# Patient Record
Sex: MALE | Race: WHITE | URBAN - METROPOLITAN AREA
[De-identification: names, ages, dates, MRNs, and addresses within clinical notes are randomized per-mention and may not be internally consistent; named-entity substitution may affect disease eponyms.]

---

## 2017-03-31 DIAGNOSIS — F41.1 GAD (GENERALIZED ANXIETY DISORDER): ICD-10-CM

## 2017-03-31 NOTE — TELEPHONE ENCOUNTER
Sertraline    Last Written Prescription Date: 03/24/16  Last Fill Quantity: 90, # refills: 3  Last Office Visit with [facility] primary care provider:  03/24/16        Last PHQ-9 score on record=   PHQ-9 SCORE 3/24/2016   Total Score -   Total Score 1

## 2017-04-03 RX ORDER — SERTRALINE HYDROCHLORIDE 100 MG/1
100 TABLET, FILM COATED ORAL DAILY
Qty: 30 TABLET | Refills: 0 | Status: SHIPPED | OUTPATIENT
Start: 2017-04-03 | End: 2017-05-11

## 2017-05-10 ENCOUNTER — OFFICE VISIT (OUTPATIENT)
Dept: FAMILY MEDICINE | Facility: CLINIC | Age: 27
End: 2017-05-10
Payer: COMMERCIAL

## 2017-05-10 VITALS
DIASTOLIC BLOOD PRESSURE: 77 MMHG | HEIGHT: 71 IN | TEMPERATURE: 98.5 F | WEIGHT: 215 LBS | SYSTOLIC BLOOD PRESSURE: 120 MMHG | BODY MASS INDEX: 30.1 KG/M2 | HEART RATE: 62 BPM

## 2017-05-10 DIAGNOSIS — Z00.01 ENCOUNTER FOR ROUTINE ADULT MEDICAL EXAM WITH ABNORMAL FINDINGS: Primary | ICD-10-CM

## 2017-05-10 DIAGNOSIS — Z13.6 CARDIOVASCULAR SCREENING; LDL GOAL LESS THAN 160: ICD-10-CM

## 2017-05-10 DIAGNOSIS — F41.1 GAD (GENERALIZED ANXIETY DISORDER): ICD-10-CM

## 2017-05-10 DIAGNOSIS — Z23 ENCOUNTER FOR IMMUNIZATION: ICD-10-CM

## 2017-05-10 PROCEDURE — 99395 PREV VISIT EST AGE 18-39: CPT | Mod: 25 | Performed by: FAMILY MEDICINE

## 2017-05-10 PROCEDURE — 90471 IMMUNIZATION ADMIN: CPT | Performed by: FAMILY MEDICINE

## 2017-05-10 PROCEDURE — 90715 TDAP VACCINE 7 YRS/> IM: CPT | Performed by: FAMILY MEDICINE

## 2017-05-10 ASSESSMENT — ANXIETY QUESTIONNAIRES
3. WORRYING TOO MUCH ABOUT DIFFERENT THINGS: SEVERAL DAYS
2. NOT BEING ABLE TO STOP OR CONTROL WORRYING: SEVERAL DAYS
7. FEELING AFRAID AS IF SOMETHING AWFUL MIGHT HAPPEN: NOT AT ALL
IF YOU CHECKED OFF ANY PROBLEMS ON THIS QUESTIONNAIRE, HOW DIFFICULT HAVE THESE PROBLEMS MADE IT FOR YOU TO DO YOUR WORK, TAKE CARE OF THINGS AT HOME, OR GET ALONG WITH OTHER PEOPLE: SOMEWHAT DIFFICULT
5. BEING SO RESTLESS THAT IT IS HARD TO SIT STILL: NOT AT ALL
1. FEELING NERVOUS, ANXIOUS, OR ON EDGE: SEVERAL DAYS
6. BECOMING EASILY ANNOYED OR IRRITABLE: MORE THAN HALF THE DAYS
GAD7 TOTAL SCORE: 5

## 2017-05-10 ASSESSMENT — PATIENT HEALTH QUESTIONNAIRE - PHQ9: 5. POOR APPETITE OR OVEREATING: NOT AT ALL

## 2017-05-10 NOTE — MR AVS SNAPSHOT
After Visit Summary   5/10/2017    Elkin Mullen    MRN: [id number]           Patient Information     Date Of Birth          1990        Visit Information        Provider Department      5/10/2017 7:00 AM Jaguar Vernon MD [facility]        Today's Diagnoses     Encounter for routine adult medical exam with abnormal findings    -  1    LAURA (generalized anxiety disorder)        BMI 30.0-30.9,adult        Need for diphtheria-tetanus-pertussis (Tdap) vaccine, adult/adolescent        CARDIOVASCULAR SCREENING; LDL GOAL LESS THAN 160          Care Instructions    Tetanus, pertussis, diphteria vaccination today.    Go to [facility] now for your blood draw.  You will be contacted in 1-2 days for results of your lab tests.    Eat 5 cups of vegetables, fruits and whole grains per day.  Limit starchy food (white rice, white bread, white pasta, white potatoes) to less than a cup per meal.  Minimize sweets, junk food and fastfood.  Exercise: moderate intensity sustained for at least 30 mins per episode, goal of 150 mins per week at least  Weight loss goal: 5-10 % of current weight in 4-6 months to start with . Goal is to get your BMI to 25.    Drink at least one glass of milk per day. Incorporate fish and other calcium-rich food to your diet.    Get a dental and vision exam soon!    You preferred to ask your insurance about HIV and hepatitis C screening coverage.  Having tattoos may be a risk for these infections, so it is advisable to get screened at least once now, then as needed if any other risk factors arise.  You may request for these in the future at your convenience.      Preventive Health Recommendations  Male Ages 26 - 39    Yearly exam:             See your health care provider every year in order to  o   Review health changes.   o   Discuss preventive care.    o   Review your medicines if your doctor has prescribed any.    You should be tested each year for STDs (sexually  transmitted diseases), if you re at risk.     After age 35, talk to your provider about cholesterol testing. If you are at risk for heart disease, have your cholesterol tested at least every 5 years.     If you are at risk for diabetes, you should have a diabetes test (fasting glucose).  Shots: Get a flu shot each year. Get a tetanus shot every 10 years.     Nutrition:    Eat at least 5 servings of fruits and vegetables daily.     Eat whole-grain bread, whole-wheat pasta and brown rice instead of white grains and rice.     Talk to your provider about Calcium and Vitamin D.     Lifestyle    Exercise for at least 150 minutes a week (30 minutes a day, 5 days a week). This will help you control your weight and prevent disease.     Limit alcohol to one drink per day.     No smoking.     Wear sunscreen to prevent skin cancer.     See your dentist every six months for an exam and cleaning.           Thank you for choosing [facility].  You may be receiving a survey in the mail from [facility] regarding your visit today.  Please take a few minutes to complete and return the survey to let us know how we are doing.      If you have questions or concerns, please contact us via [employer] or you can contact your care team at [phone].    Our Clinic hours are:  Monday 6:40 am  to 7:00 pm  Tuesday -Friday 6:40 am to 5:00 pm    The [place] outpatient lab hours are:  Monday - Friday 6:10 am to 4:45 pm  Saturdays 7:00 am to 11:00 am  Appointments are required, call [phone]    If you have clinical questions after hours or would like to schedule an appointment,  call the clinic at [phone].          Follow-ups after your visit        Who to contact     If you have questions or need follow up information about today's clinic visit or your schedule please contact [facility] directly at [phone].  Normal or non-critical lab and imaging results will be communicated to you by MyChart, letter or phone  "within 4 business days after the clinic has received the results. If you do not hear from us within 7 days, please contact the clinic through [facility] or phone. If you have a critical or abnormal lab result, we will notify you by phone as soon as possible.  Submit refill requests through [facility] or call your pharmacy and they will forward the refill request to us. Please allow 3 business days for your refill to be completed.          Additional Information About Your Visit        [facility] Information     [facility] lets you send messages to your doctor, view your test results, renew your prescriptions, schedule appointments and more. To sign up, go to [email or web address] . Click on \"Log in\" on the left side of the screen, which will take you to the Welcome page. Then click on \"Sign up Now\" on the right side of the page.     You will be asked to enter the access code listed below, as well as some personal information. Please follow the directions to create your username and password.     Your access code is: [email or web address]  Expires: 2017  7:45 AM     Your access code will  in 90 days. If you need help or a new code, please call your [place] clinic or [phone].        Care EveryWhere ID     This is your Care EveryWhere ID. This could be used by other organizations to access your [facility] [place] medical records  [id number]        Your Vitals Were     Pulse Temperature Height BMI (Body Mass Index)          62 98.5  F (36.9  C) (Tympanic) 5' 10.5\" (1.791 m) 30.41 kg/m2         Blood Pressure from Last 3 Encounters:   05/10/17 120/77   16 128/77   03/05/15 125/75    Weight from Last 3 Encounters:   05/10/17 215 lb (97.5 kg)   16 197 lb (89.4 kg)   03/05/15 194 lb 9.6 oz (88.3 kg)              We Performed the Following     Glucose     Lipid panel reflex to direct LDL        Primary Care Provider    Physician No Ref-Primary       No address on file        Thank you!     Thank you for choosing [facility]  for your care. Our goal is always to provide you with excellent care. Hearing back from our patients is one way we can continue to improve our services. Please take a few minutes to complete the written survey that you may receive in the mail after your visit with us. Thank you!             Your Updated Medication List - Protect others around you: Learn how to safely use, store and throw away your medicines at www.disposemymeds.org.          This list is accurate as of: 5/10/17  7:45 AM.  Always use your most recent med list.                   Brand Name Dispense Instructions for use    sertraline 100 MG tablet    ZOLOFT    30 tablet    Take 1 tablet (100 mg) by mouth daily (Needs follow-up appointment for this medication)

## 2017-05-10 NOTE — PATIENT INSTRUCTIONS
Tetanus, pertussis, diphteria vaccination today.    Go to Clinic [facility] now for your blood draw.  You will be contacted in 1-2 days for results of your lab tests.    Eat 5 cups of vegetables, fruits and whole grains per day.  Limit starchy food (white rice, white bread, white pasta, white potatoes) to less than a cup per meal.  Minimize sweets, junk food and fastfood.  Exercise: moderate intensity sustained for at least 30 mins per episode, goal of 150 mins per week at least  Weight loss goal: 5-10 % of current weight in 4-6 months to start with . Goal is to get your BMI to 25.    Drink at least one glass of milk per day. Incorporate fish and other calcium-rich food to your diet.    Get a dental and vision exam soon!    You preferred to ask your insurance about HIV and hepatitis C screening coverage.  Having tattoos may be a risk for these infections, so it is advisable to get screened at least once now, then as needed if any other risk factors arise.  You may request for these in the future at your convenience.      Preventive Health Recommendations  Male Ages 26 - 39    Yearly exam:             See your health care provider every year in order to  o   Review health changes.   o   Discuss preventive care.    o   Review your medicines if your doctor has prescribed any.    You should be tested each year for STDs (sexually transmitted diseases), if you re at risk.     After age 35, talk to your provider about cholesterol testing. If you are at risk for heart disease, have your cholesterol tested at least every 5 years.     If you are at risk for diabetes, you should have a diabetes test (fasting glucose).  Shots: Get a flu shot each year. Get a tetanus shot every 10 years.     Nutrition:    Eat at least 5 servings of fruits and vegetables daily.     Eat whole-grain bread, whole-wheat pasta and brown rice instead of white grains and rice.     Talk to your provider about Calcium and Vitamin D.     Lifestyle    Exercise for  at least 150 minutes a week (30 minutes a day, 5 days a week). This will help you control your weight and prevent disease.     Limit alcohol to one drink per day.     No smoking.     Wear sunscreen to prevent skin cancer.     See your dentist every six months for an exam and cleaning.           Thank you for choosing [facility].  You may be receiving a survey in the mail from [facility] regarding your visit today.  Please take a few minutes to complete and return the survey to let us know how we are doing.      If you have questions or concerns, please contact us via [employer] or you can contact your care team at [phone].    Our Clinic hours are:  Monday 6:40 am  to 7:00 pm  Tuesday -Friday 6:40 am to 5:00 pm    The [place] outpatient lab hours are:  Monday - Friday 6:10 am to 4:45 pm  Saturdays 7:00 am to 11:00 am  Appointments are required, call [phone]    If you have clinical questions after hours or would like to schedule an appointment,  call the clinic at [phone].

## 2017-05-10 NOTE — NURSING NOTE
"Chief Complaint   Patient presents with     Physical     Imm/Inj     Patient states he is up to date on tetanus, will check records.       Initial /77 (BP Location: Right arm, Patient Position: Chair, Cuff Size: Adult Large)  Pulse 62  Temp 98.5  F (36.9  C) (Tympanic)  Ht 5' 10.5\" (1.791 m)  Wt 215 lb (97.5 kg)  BMI 30.41 kg/m2 Estimated body mass index is 30.41 kg/(m^2) as calculated from the following:    Height as of this encounter: 5' 10.5\" (1.791 m).    Weight as of this encounter: 215 lb (97.5 kg).  Medication Reconciliation: complete     Screening Questionnaire for Adult Immunization    Are you sick today?   No   Do you have allergies to medications, food, a vaccine component or latex?   No   Have you ever had a serious reaction after receiving a vaccination?   No   Do you have a long-term health problem with heart disease, lung disease, asthma, kidney disease, metabolic disease (e.g. diabetes), anemia, or other blood disorder?   No   Do you have cancer, leukemia, HIV/AIDS, or any other immune system problem?   No   In the past 3 months, have you taken medications that affect  your immune system, such as prednisone, other steroids, or anticancer drugs; drugs for the treatment of rheumatoid arthritis, Crohn s disease, or psoriasis; or have you had radiation treatments?   No   Have you had a seizure, or a brain or other nervous system problem?   No   During the past year, have you received a transfusion of blood or blood     products, or been given immune (gamma) globulin or antiviral drug?   No   For women: Are you pregnant or is there a chance you could become        pregnant during the next month?   No   Have you received any vaccinations in the past 4 weeks?   No     Immunization questionnaire answers were all negative.          Per orders of Dr. Vernon, injection of Tdap given by Kate Rod. Patient instructed to remain in clinic for 20 minutes afterwards, and to report any adverse " reaction to me immediately.       Screening performed by Kate Rod on 5/10/2017 at 8:09 AM.

## 2017-05-10 NOTE — PROGRESS NOTES
"  SUBJECTIVE:     CC: Elkin Mullen is an 26 year old male who presents for preventative health visit.     Healthy Habits:    Do you get at least three servings of calcium containing foods daily (dairy, green leafy vegetables, etc.)? no, taking calcium and/or vitamin D supplement: no    Amount of exercise or daily activities, outside of work: walk the dog, remodeling house    Problems taking medications regularly:  will forget to take meds occasionally    Medication side effects: Yes will notice some lightheadedness if he forgets to take for a couple of days.      Have you had an eye exam in the past two years? no    Do you see a dentist twice per year? no    Do you have sleep apnea, excessive snoring or daytime drowsiness?yes, snoring      Anxiety Follow-Up    Status since last visit: No change    Other associated symptoms:None    Complicating factors:   Significant life event: No   Current substance abuse: None  Depression symptoms: No  LAURA-7 SCORE 12/8/2014 3/24/2016 5/10/2017   Total Score 9 - -   Total Score - 3 5        GAD7     Patient states he is \"stable\" with his current medication regimen.  Patient ran out of Sertraline 100 mg once a day.  Patient has denied suicidality or homicidality.  Patient states he has mild and occasional irritability, but no panic attacks.      Today's PHQ-2 Score:   PHQ-2 ( 1999 Pfizer) 12/8/2014   Q1: Little interest or pleasure in doing things 0   Q2: Feeling down, depressed or hopeless 0   PHQ-2 Score 0       Abuse: Current or Past(Physical, Sexual or Emotional)- No  Do you feel safe in your environment - Yes    Social History   Substance Use Topics     Smoking status: Former Smoker     Smokeless tobacco: Former User     Alcohol use Yes      Comment: weekends     The patient does not drink >3 drinks per day nor >7 drinks per week.    Last PSA: No results found for: PSA    No results for input(s): CHOL, HDL, LDL, TRIG, CHOLHDLRATIO, NHDL in the last [id number] hours.    Reviewed " orders with patient. Reviewed health maintenance and updated orders accordingly - Yes    Reviewed and updated as needed this visit by clinical staff  Tobacco  Allergies  Meds  Problems  Med Hx  Surg Hx  Fam Hx  Soc Hx          Reviewed and updated as needed this visit by Provider  Allergies  Meds  Problems        History reviewed. No pertinent past medical history.   History reviewed. No pertinent surgical history.    ROS:  C: NEGATIVE for fever, chills, change in weight  I: NEGATIVE for worrisome rashes, moles or lesions  E: NEGATIVE for vision changes or irritation  ENT: NEGATIVE for ear, mouth and throat problems  R: NEGATIVE for significant cough or SOB  CV: NEGATIVE for chest pain, palpitations or peripheral edema  GI: NEGATIVE for nausea, abdominal pain, heartburn, or change in bowel habits   male: negative for dysuria, hematuria, decreased urinary stream, erectile dysfunction, urethral discharge  M: NEGATIVE for significant arthralgias or myalgia  N: NEGATIVE for weakness, dizziness or paresthesias  E: NEGATIVE for temperature intolerance, skin/hair changes  H: NEGATIVE for bleeding problems  P: NEGATIVE for changes in mood or affect    Patient Active Problem List   Diagnosis     Anxiety     CARDIOVASCULAR SCREENING; LDL GOAL LESS THAN 160     BMI 30.0-30.9,adult     History reviewed. No pertinent surgical history.    Social History   Substance Use Topics     Smoking status: Former Smoker     Smokeless tobacco: Former User     Alcohol use Yes      Comment: weekends     Family History   Problem Relation Age of Onset     DIABETES Maternal Grandmother          Current Outpatient Prescriptions   Medication Sig Dispense Refill     sertraline (ZOLOFT) 100 MG tablet Take 1 tablet (100 mg) by mouth daily (Needs follow-up appointment for this medication) 30 tablet 0     No Known Allergies  OBJECTIVE:     /77 (BP Location: Right arm, Patient Position: Chair, Cuff Size: Adult Large)  Pulse 62  Temp  "98.5  F (36.9  C) (Tympanic)  Ht 5' 10.5\" (1.791 m)  Wt 215 lb (97.5 kg)  BMI 30.41 kg/m2  EXAM:  GENERAL APPEARANCE: obese, alert and no distress  EYES: pink conj, no icterus, PERRL, EOMI  HENT: ear canals and TM's normal, nose and mouth without ulcers or lesions, oropharynx clear and oral mucous membranes moist  NECK: no adenopathy, no asymmetry, masses, or scars and thyroid normal to palpation  RESP: lungs clear to auscultation - no rales, rhonchi or wheezes  CV: regular rates and rhythm, normal S1 S2, no S3 or S4, no murmur, click or rub, no peripheral edema and peripheral pulses strong  ABDOMEN: soft, nontender, no hepatosplenomegaly, no masses and bowel sounds normal  SKIN: no suspicious lesions or rashes  NEURO: Normal strength and tone, sensory exam grossly normal, mentation intact and speech normal  PSYCH: well-kempt, linear thought process, appropriate affect, normal mood, no suicidality/homicidality    ASSESSMENT/PLAN:     Elkin was seen today for physical and imm/inj.    Diagnoses and all orders for this visit:    Encounter for routine adult medical exam with abnormal findings  -     Glucose; Future  -     Lipid Profile with reflex to direct LDL; Future  Patient was advised on recommended screening and preventive health recommendations.  He verbalized understanding and agreed to the plans below.    LAURA (generalized anxiety disorder)  Stable.  Continue sertraline 100 mg daily.  Reinforced behavioral approaches to manage aanxiety and stress.    BMI 30.0-30.9,adult  -     Glucose; Future  -     Lipid Profile with reflex to direct LDL; Future  Discussed risks of obesity: heart disease, stroke, end-organ damage, DM, decreased quality of life  Counselled on diet, exercise and weight loss regimen      CARDIOVASCULAR SCREENING; LDL GOAL LESS THAN 160  -     Cancel: Lipid panel reflex to direct LDL  -     Lipid Profile with reflex to direct LDL; Future    Encounter for immunization  -     TDAP VACCINE " "(ADACEL)  -     ADMIN 1st VACCINE      COUNSELING:  Reviewed preventive health counseling, as reflected in patient instructions       Regular exercise       Healthy diet/nutrition       Vision screening       Hearing screening       Vaccinated for: TDAP       Family planning       Safe sex practices/STD prevention       Osteoporosis Prevention/Bone Health    BP Screening:   Last 3 BP Readings:    BP Readings from Last 3 Encounters:   05/10/17 120/77   03/24/16 128/77   03/05/15 125/75       The following was recommended to the patient:  Re-screen BP within a year and recommended lifestyle modifications     reports that he has quit smoking. He has quit using smokeless tobacco.    Estimated body mass index is 30.41 kg/(m^2) as calculated from the following:    Height as of this encounter: 5' 10.5\" (1.791 m).    Weight as of this encounter: 215 lb (97.5 kg).   Weight management plan: Discussed healthy diet and exercise guidelines and patient will follow up in 12 months in clinic to re-evaluate.    Counseling Resources:  ATP IV Guidelines  Pooled Cohorts Equation Calculator  FRAX Risk Assessment  ICSI Preventive Guidelines  Dietary Guidelines for Americans, 2010  USDA's MyPlate  ASA Prophylaxis  Lung CA Screening    Jaguar Vernon MD  [facility]  "

## 2017-05-10 NOTE — LETTER
[facility]  [address] [address]  [phone]        February 15, 2018    Elkin Mullen  [address] [facility] [address]              Dear Elkin Mullen    This is to remind you that your Fasting Lipid profile and Glucose lab is due.    You may call our office at [phone] to schedule an appointment.    Please disregard this notice if you have already had your labs drawn or made an appointment.        Sincerely,        Jaguar Vernon MD

## 2017-05-11 ENCOUNTER — TELEPHONE (OUTPATIENT)
Dept: FAMILY MEDICINE | Facility: CLINIC | Age: 27
End: 2017-05-11

## 2017-05-11 DIAGNOSIS — F41.1 GAD (GENERALIZED ANXIETY DISORDER): ICD-10-CM

## 2017-05-11 RX ORDER — SERTRALINE HYDROCHLORIDE 100 MG/1
100 TABLET, FILM COATED ORAL DAILY
Qty: 90 TABLET | Refills: 1 | Status: SHIPPED | OUTPATIENT
Start: 2017-05-11 | End: 2017-11-10

## 2017-05-11 RX ORDER — SERTRALINE HYDROCHLORIDE 100 MG/1
TABLET, FILM COATED ORAL
Qty: 30 TABLET | Refills: 0 | OUTPATIENT
Start: 2017-05-11

## 2017-05-11 ASSESSMENT — ANXIETY QUESTIONNAIRES: GAD7 TOTAL SCORE: 5

## 2017-05-11 ASSESSMENT — PATIENT HEALTH QUESTIONNAIRE - PHQ9: SUM OF ALL RESPONSES TO PHQ QUESTIONS 1-9: 2

## 2017-05-11 NOTE — TELEPHONE ENCOUNTER
Prescription approved per [facility] Refill Protocol.  Regla RODRIGES RN

## 2017-05-11 NOTE — TELEPHONE ENCOUNTER
sertraline (ZOLOFT) 100 MG tablet     Last Written Prescription Date: 4/3/17  Last Fill Quantity: 30, # refills: 0  Last Office Visit with G primary care provider:  5/10/17        Last PHQ-9 score on record=   PHQ-9 SCORE 5/10/2017   Total Score -   Total Score 2       Pt is out and wants this filled today, girl friend/wife calling requesting [phone]  Please advise     Yee Sommer  [facility]

## 2017-11-10 DIAGNOSIS — F41.1 GAD (GENERALIZED ANXIETY DISORDER): ICD-10-CM

## 2017-11-16 RX ORDER — SERTRALINE HYDROCHLORIDE 100 MG/1
TABLET, FILM COATED ORAL
Qty: 90 TABLET | Refills: 1 | Status: SHIPPED | OUTPATIENT
Start: 2017-11-16 | End: 2018-03-06

## 2018-03-06 ENCOUNTER — OFFICE VISIT (OUTPATIENT)
Dept: FAMILY MEDICINE | Facility: CLINIC | Age: 28
End: 2018-03-06
Payer: COMMERCIAL

## 2018-03-06 VITALS
DIASTOLIC BLOOD PRESSURE: 88 MMHG | HEIGHT: 70 IN | WEIGHT: 214 LBS | HEART RATE: 68 BPM | OXYGEN SATURATION: 99 % | BODY MASS INDEX: 30.64 KG/M2 | TEMPERATURE: 97.3 F | RESPIRATION RATE: 14 BRPM | SYSTOLIC BLOOD PRESSURE: 130 MMHG

## 2018-03-06 DIAGNOSIS — R79.89 ELEVATED LFTS: ICD-10-CM

## 2018-03-06 DIAGNOSIS — F41.0 PANIC ATTACK: ICD-10-CM

## 2018-03-06 DIAGNOSIS — F41.1 GAD (GENERALIZED ANXIETY DISORDER): Primary | ICD-10-CM

## 2018-03-06 LAB
ERYTHROCYTE [DISTWIDTH] IN BLOOD BY AUTOMATED COUNT: 13.6 % (ref 10–15)
HCT VFR BLD AUTO: 43.9 % (ref 40–53)
HGB BLD-MCNC: 14.9 G/DL (ref 13.3–17.7)
MCH RBC QN AUTO: 29.8 PG (ref 26.5–33)
MCHC RBC AUTO-ENTMCNC: 33.9 G/DL (ref 31.5–36.5)
MCV RBC AUTO: 88 FL (ref 78–100)
PLATELET # BLD AUTO: 187 10E9/L (ref 150–450)
RBC # BLD AUTO: 5 10E12/L (ref 4.4–5.9)
WBC # BLD AUTO: 4.7 10E9/L (ref 4–11)

## 2018-03-06 PROCEDURE — 85027 COMPLETE CBC AUTOMATED: CPT | Performed by: NURSE PRACTITIONER

## 2018-03-06 PROCEDURE — 84443 ASSAY THYROID STIM HORMONE: CPT | Performed by: NURSE PRACTITIONER

## 2018-03-06 PROCEDURE — 99214 OFFICE O/P EST MOD 30 MIN: CPT | Performed by: NURSE PRACTITIONER

## 2018-03-06 PROCEDURE — 80053 COMPREHEN METABOLIC PANEL: CPT | Performed by: NURSE PRACTITIONER

## 2018-03-06 PROCEDURE — 83721 ASSAY OF BLOOD LIPOPROTEIN: CPT | Performed by: NURSE PRACTITIONER

## 2018-03-06 PROCEDURE — 36415 COLL VENOUS BLD VENIPUNCTURE: CPT | Performed by: NURSE PRACTITIONER

## 2018-03-06 RX ORDER — SERTRALINE HYDROCHLORIDE 100 MG/1
TABLET, FILM COATED ORAL
Qty: 90 TABLET | Refills: 1 | Status: SHIPPED | OUTPATIENT
Start: 2018-03-06 | End: 2018-03-06

## 2018-03-06 RX ORDER — SERTRALINE HYDROCHLORIDE 100 MG/1
TABLET, FILM COATED ORAL
Qty: 90 TABLET | Refills: 1 | Status: SHIPPED | OUTPATIENT
Start: 2018-03-06 | End: 2019-05-02

## 2018-03-06 RX ORDER — HYDROXYZINE HYDROCHLORIDE 25 MG/1
50-100 TABLET, FILM COATED ORAL
Qty: 30 TABLET | Refills: 1 | Status: SHIPPED | OUTPATIENT
Start: 2018-03-06 | End: 2018-12-07

## 2018-03-06 ASSESSMENT — ANXIETY QUESTIONNAIRES
3. WORRYING TOO MUCH ABOUT DIFFERENT THINGS: NEARLY EVERY DAY
6. BECOMING EASILY ANNOYED OR IRRITABLE: NEARLY EVERY DAY
GAD7 TOTAL SCORE: 19
1. FEELING NERVOUS, ANXIOUS, OR ON EDGE: NEARLY EVERY DAY
5. BEING SO RESTLESS THAT IT IS HARD TO SIT STILL: SEVERAL DAYS
2. NOT BEING ABLE TO STOP OR CONTROL WORRYING: NEARLY EVERY DAY
7. FEELING AFRAID AS IF SOMETHING AWFUL MIGHT HAPPEN: NEARLY EVERY DAY
IF YOU CHECKED OFF ANY PROBLEMS ON THIS QUESTIONNAIRE, HOW DIFFICULT HAVE THESE PROBLEMS MADE IT FOR YOU TO DO YOUR WORK, TAKE CARE OF THINGS AT HOME, OR GET ALONG WITH OTHER PEOPLE: NOT DIFFICULT AT ALL

## 2018-03-06 ASSESSMENT — PAIN SCALES - GENERAL: PAINLEVEL: NO PAIN (0)

## 2018-03-06 ASSESSMENT — PATIENT HEALTH QUESTIONNAIRE - PHQ9: 5. POOR APPETITE OR OVEREATING: NEARLY EVERY DAY

## 2018-03-06 NOTE — PATIENT INSTRUCTIONS
Panic Attack  A panic attack is an extreme fear reaction that comes on for no clear reason. There is often a fear that something terrible will happen or that you may die. The attack may last a few minutes up to a few hours. Between attacks, things will seem quite normal. This condition has a psychological cause and can be treated with the help of a therapist or psychiatrist. Medicine can be very helpful for this problem.  Panic attacks usually come on suddenly, reaches a peak within minutes, and includes at least 4 of these symptoms:    Palpitations, pounding heart, or accelerated heart rate    Sweating    Crying    Trembling or shaking    Sensations of shortness of breath or smothering    Feelings of choking    Chest pain or discomfort    Nausea or abdominal distress    Feeling dizzy, unsteady, light-headed, or faint    Numbness or tingling sensations    Fear of dying    Fear of going crazy or of losing control    Feelings of unreality, strangeness, or detachment from the environment  Many of these symptoms can be linked to physical problems, so it is sometimes necessary to rule out conditions like thyroid disorders, heart disease, gastrointestinal problems, and others. They can also start as physical symptoms, but psychologically we may react to them in a fearful way, worsening the way we react and feel.  Home care    Try to find the sources of stress in your life. They may not be obvious. These may include:    Daily hassles of life which pile up (traffic jams, missed appointments, car troubles, etc.).    Major life changes, both good (new baby, job promotion) and bad (loss of job, loss of loved one).    Feeling that you have too many responsibilities and can't take care of everything at once.    Helplessness: feeling like your problems are too much for you to handle.    Notice how your body reacts to stress. Learn to listen to your body signals so that you can take action before the stress becomes severe.     Try to be aware of what you were doing before the reaction started; this may give you clues to things that can trigger a reaction. It may be situations in your life, or what you were doing at the time.    When possible, avoid or reduce the cause of stress. Avoid hassles, limit the amount of change that is happening in your life at one time or take a break when you feel overloaded.    Unfortunately, many stressful situations cannot be avoided. Therefore, it is necessary to learn how to manage stress better. There are many proven methods that work and will reduce your anxiety. These include simple things like exercise, good nutrition and adequate rest. Also, there are certain techniques that are helpful: relaxation and breathing exercises, visualization, biofeedback, meditation or simply taking some time-out to clear your mind. For more information about this, ask your doctor or go to a local bookstore and review the many books and tapes available on this subject.  Follow-up care  Follow-up with your healthcare provider, or as advised.  Call 911  Call 911 if any of these occur:    Trouble breathing    Confusion    Very drowsy or trouble awakening    Fainting or loss of consciousness    Rapid heart rate    Seizure    New chest pain that becomes more severe, lasts longer, or spreads into your shoulder, arm, neck, jaw or back  When to seek medical advice  Call your healthcare provider right away if any of these occur:    Worsening of your symptoms to the point of feeling out-of-control    Increased pain with breathing    Increasing feeling of weakness or dizziness    Cough with dark colored sputum (phlegm) or blood    Fever 1 degree above normal temperature ) lasting 24 to 48 hours or what your healthcare provider has advised    Swelling, pain or redness in one leg    Requests by family or friends for you to seek help for your symptoms  Date Last Reviewed: 9/29/2015 2000-2017 [employer]. [address] [place] [address]  [facility], [place] [address]. All rights reserved. This information is not intended as a substitute for professional medical care. Always follow your healthcare professional's instructions.        Treating Anxiety Disorders with Therapy    If you have an anxiety disorder, you don t have to suffer anymore. Treatment is available. Therapy (also called counseling) is often a helpful treatment for anxiety disorders. With therapy, a specially trained professional (therapist) helps you face and learn to manage your anxiety. Therapy can be short-term or long-term depending on your needs. In some cases, medicine may also be prescribed with therapy. It may take time before you notice how much therapy is helping, but stick with it. With therapy, you can feel better.  Cognitive behavioral therapy (CBT)  Cognitive behavioral therapy (CBT) teaches you to manage anxiety. It does this by helping you understand how you think and act when you re anxious. Research has shown CBT to be a very effective treatment for anxiety disorders. How CBT is run is almost like a class. It involves homework and activities to build skills that teach you to cope with anxiety step by step. It can be done in a group or one-on-one, and often takes place for a set number of sessions. CBT has two main parts:    Cognitive therapy helps you identify the negative, irrational thoughts that occur with your anxiety. You ll learn to replace these with more positive, realistic thoughts.    Behavioral therapy helps you change how you react to anxiety. You ll learn coping skills and methods for relaxing to help you better deal with anxiety.  Other forms of therapy  Other therapy methods may work better for you than CBT. Or, you may move from CBT to another form of therapy as your treatment needs change. This may mean meeting with a therapist by yourself or in a group. Therapy can also help you work through problems in your life, such as drug or alcohol dependence,  that may be making your anxiety worse.  Getting better takes time  Therapy will help you feel better and teach you skills to help manage anxiety long term. But change doesn t happen right away. It takes a commitment from you. And treatment only works if you learn to face the causes of your anxiety. So, you might feel worse before you feel better. This can sometimes make it hard to stick with it. But remember: Therapy is a very effective treatment. The results will be well worth it.  Helping yourself  If anxiety is wearing you down, here are some things you can do to cope:    Check with your doctor and rule out any physical problems that may be causing the anxiety symptoms.    If an anxiety disorder is diagnosed, seek mental healthcare. This is an illness and it can respond to treatment. Most types of anxiety disorders will respond to talk therapy and medicine.    Educate yourself about anxiety disorders. Keep track of helpful online resources and books you can use during stressful periods.    Try stress management techniques such as meditation.    Consider online or in-person support groups.    Don t fight your feelings. Anxiety feeds itself. The more you worry about it, the worse it gets. Instead, try to identify what might have triggered your anxiety. Then try to put this threat in perspective.    Keep in mind that you can t control everything about a situation. Change what you can and let the rest take its course.    Exercise   it s a great way to relieve tension and help your body feel relaxed.    Examine your life for stress, and try to find ways to reduce it.    Avoid caffeine and nicotine, which can make anxiety symptoms worse.    Fight the temptation to turn to alcohol or unprescribed drugs for relief. They only make things worse in the long run.   Date Last Reviewed: 1/1/2017 2000-2017 [employer]. [address] [facility], [place] [address]. All rights reserved. This information is not  intended as a substitute for professional medical care. Always follow your healthcare professional's instructions.        Treating Anxiety Disorders with Medicine  An anxiety disorder can make you feel nervous or apprehensive, even without a clear reason. In people age 65 and older, generalized anxiety disorder is one of the most commonly diagnosed anxiety disorders. Many times it occurs with depression. Certain anxiety disorders can cause intense feelings of fear or panic. You may even have physical symptoms such as a racing heartbeat, sweating, or dizziness. If you have these feelings, you don t have to suffer anymore. Treatment to help you overcome your fears will likely include therapy (also called counseling). Medicine may also be prescribed to help control your symptoms.    Medicines  Certain medicines may be prescribed to help control your symptoms. So you may feel less anxious. You may also feel able to move forward with therapy. At first, medicines and dosages may need to be adjusted to find what works best for you. Try to be patient. Tell your healthcare provider how a medicine makes you feel. This way, you can work together to find the treatment that s best for you. Keep in mind that medicines can have side effects. Talk with your provider about any side effects that are bothering you. Changing the dose or type of medicine may help. Don t stop taking medicine on your own. That can cause symptoms to come back.    Anti-anxiety medicine. This medicine eases symptoms and helps you relax. Your healthcare provider will explain when and how to use it. It may be prescribed for use before situations that make you anxious. You may also be told to take medicine on a regular schedule. Anti-anxiety medicine may make you feel a little sleepy or  out of it.  Don t drive a car or operate machinery while on this medicine, until you know how it affects you.  Caution  Never use alcohol or other drugs with anti-anxiety  medicines. This could result in loss of muscular control, sedation, coma, or death. Also, use only the amount of medicine prescribed for you. If you think you may have taken too much, get emergency care right away.     Antidepressant medicine. This kind of medicine is often used to treat anxiety, even if you aren t depressed. An antidepressant helps balance out brain chemicals. This helps keep anxiety under control. This medicine is taken on a schedule. It takes a few weeks to start working. If you don t notice a change at first, you may just need more time. But if you don t notice results after the first few weeks, tell your provider.  Keep taking medicines as prescribed  Never change your dosage, share or use another person's medicine, or stop taking your medicines without talking to your healthcare provider first. Keep the following in mind:    Some medicines must be taken on a schedule. Make this part of your daily routine. For instance, always take your pill before brushing your teeth. A pillbox can help you remember if you ve taken your medicine each day.    Medicines are often taken for 6 to 12 months. Your healthcare provider will then evaluate whether you need to stay on them. Many people who have also had therapy may no longer need medicine to manage anxiety.    You may need to stop taking medicine slowly to give your body time to adjust. When it s time to stop, your healthcare provider will tell you more. Remember: Never stop taking your medicine without talking to your provider first.    If symptoms return, you may need to start taking medicines again. This isn t your fault. It s just the nature of your anxiety disorder.  Special concerns    Side effects. Medicines may cause side effects. Ask your healthcare provider or pharmacist what you can expect. They may have ideas for avoiding some side effects.    Sexual problems. Some antidepressants can affect your desire for sex or your ability to have an  orgasm. A change in dosage or medicine often solves the problem. If you have a sexual side effect that concerns you, tell your healthcare provider.    Addiction. If you ve never had a problem with drugs or alcohol, you may not have a problem with medicines used to treat anxiety disorders. But always discuss the medicines with your healthcare provider before taking them. If you have a history of addiction, you may not be able to use certain medicines used to treat anxiety disorders.    Medicine interactions. Always check with your pharmacist before using any over-the-counter medicines, including herbal supplements.   Date Last Reviewed: 5/1/2017 2000-2017 [employer]. [address] [place] [address], [place] [address]. All rights reserved. This information is not intended as a substitute for professional medical care. Always follow your healthcare professional's instructions.

## 2018-03-06 NOTE — PROGRESS NOTES
SUBJECTIVE:   Elkin Mullen is a 27 year old male who presents to clinic today for the following health issues:      Depression and Anxiety Follow-Up    Status since last visit: depression has been good. Anxiety has Worsened 3 weeks     Other associated symptoms:None    Complicating factors: none     Significant life event: none    Current substance abuse: None    Would like to continue on the sertraline.  Wondering if there is something he can take at night to help with the anxiety and sleep.  Has had panic attacks in the past with palpitations sweating palms difficulty with shortness of breath or racing thoughts.  Denies any suicidal or homicidal ideation    Lab work today.     Denies any ongoing drug or alcohol use    PHQ-9 3/24/2016 5/10/2017   Total Score 1 2   Q9: Suicide Ideation Not at all Not at all     LAURA-7 SCORE 12/8/2014 3/24/2016 5/10/2017   Total Score 9 - -   Total Score - 3 5       PHQ-9  English  PHQ-9   Any Language  LAURA-7  Suicide Assessment Five-step Evaluation and Treatment (SAFE-T)        -------------------------------------    Problem list and histories reviewed & adjusted, as indicated.  Additional history: as documented    Patient Active Problem List   Diagnosis     Anxiety     CARDIOVASCULAR SCREENING; LDL GOAL LESS THAN 160     BMI 30.0-30.9,adult     LAURA (generalized anxiety disorder)     No past surgical history on file.    Social History   Substance Use Topics     Smoking status: Former Smoker     Smokeless tobacco: Former User     Alcohol use Yes      Comment: weekends     Family History   Problem Relation Age of Onset     DIABETES Maternal Grandmother          Labs reviewed in EPIC    Reviewed and updated as needed this visit by clinical staff  Allergies  Meds       Reviewed and updated as needed this visit by Provider         ROS:   ROS: 10 point ROS neg other than the symptoms noted above in the HPI.      OBJECTIVE:                                                    /88   "Pulse 68  Temp 97.3  F (36.3  C) (Tympanic)  Resp 14  Ht 5' 10\" (1.778 m)  Wt 214 lb (97.1 kg)  SpO2 99%  BMI 30.71 kg/m2  Body mass index is 30.71 kg/(m^2).   GENERAL: healthy, alert, well nourished, well hydrated, no distress  HENT: ear canals- normal; TMs- normal; Nose- normal; Mouth- no ulcers, no lesions  NECK: no tenderness, no adenopathy, no asymmetry, no masses, no stiffness; thyroid- normal to palpation  RESP: lungs clear to auscultation - no rales, no rhonchi, no wheezes  CV: regular rates and rhythm, normal S1 S2, no S3 or S4 and no murmur, no click or rub -  ABDOMEN: soft, no tenderness, no  hepatosplenomegaly, no masses, normal bowel sounds  MS: extremities- no gross deformities noted, no edema  SKIN: no suspicious lesions, no rashes  PSYCH: Alert and oriented times 3; speech- coherent , normal rate and volume; able to articulate logical thoughts, able to abstract reason, no tangential thoughts, no hallucinations or delusions, affect- normal  LYMPHATICS: ant. cervical- normal, post. cervical- normal, axillary- normal, supraclavicular- normal, inguinal- normal    Diagnostic test results:  Results for orders placed or performed in visit on 03/06/18   CBC with platelets   Result Value Ref Range    WBC 4.7 4.0 - 11.0 10e9/L    RBC Count 5.00 4.4 - 5.9 10e12/L    Hemoglobin 14.9 13.3 - 17.7 g/dL    Hematocrit 43.9 40.0 - 53.0 %    MCV 88 78 - 100 fl    MCH 29.8 26.5 - 33.0 pg    MCHC 33.9 31.5 - 36.5 g/dL    RDW 13.6 10.0 - 15.0 %    Platelet Count 187 150 - 450 10e9/L   Comprehensive metabolic panel (BMP + Alb, Alk Phos, ALT, AST, Total. Bili, TP)   Result Value Ref Range    Sodium 137 133 - 144 mmol/L    Potassium 4.9 3.4 - 5.3 mmol/L    Chloride 106 94 - 109 mmol/L    Carbon Dioxide 25 20 - 32 mmol/L    Anion Gap 6 3 - 14 mmol/L    Glucose 96 70 - 99 mg/dL    Urea Nitrogen 17 7 - 30 mg/dL    Creatinine 0.79 0.66 - 1.25 mg/dL    GFR Estimate >90 >60 mL/min/1.7m2    GFR Estimate If Black >90 >60 " mL/min/1.7m2    Calcium 9.1 8.5 - 10.1 mg/dL    Bilirubin Total 0.5 0.2 - 1.3 mg/dL    Albumin 4.3 3.4 - 5.0 g/dL    Protein Total 7.6 6.8 - 8.8 g/dL    Alkaline Phosphatase 64 40 - 150 U/L    ALT 87 (H) 0 - 70 U/L    AST 57 (H) 0 - 45 U/L   TSH with free T4 reflex   Result Value Ref Range    TSH 1.86 0.40 - 4.00 mU/L   LDL cholesterol direct   Result Value Ref Range    LDL Cholesterol Direct 52 <100 mg/dL        ASSESSMENT/PLAN:                                                    1. LAURA (generalized anxiety disorder)  - CBC with platelets  - Comprehensive metabolic panel (BMP + Alb, Alk Phos, ALT, AST, Total. Bili, TP)  - TSH with free T4 reflex  - LDL cholesterol direct  - hydrOXYzine (ATARAX) 25 MG tablet; Take 2-4 tablets ( mg) by mouth nightly as needed for anxiety  Dispense: 30 tablet; Refill: 1  - sertraline (ZOLOFT) 100 MG tablet; TAKE 1 TABLET(100 MG) BY MOUTH DAILY  Dispense: 90 tablet; Refill: 1  If anxiety persists consider the use of Lexapro or citalopram instead.  New medication hydroxyzine  Avoid mood altering chemicals  Psychotherapy strongly recommended  Psychiatric medication management discussed.  Declined    2. Panic attack  Psychotherapy, psychiatric medication management recommended  Labs today.  We will contact with results  - CBC with platelets  - Comprehensive metabolic panel (BMP + Alb, Alk Phos, ALT, AST, Total. Bili, TP)  - TSH with free T4 reflex  - LDL cholesterol direct  New medication  - hydrOXYzine (ATARAX) 25 MG tablet; Take 2-4 tablets ( mg) by mouth nightly as needed for anxiety  Dispense: 30 tablet; Refill: 1    3. Elevated LFTs  LFTs elevated today.  Avoid Tylenol and alcohol.  Repeat labs again in 1 month.  Ultrasound liver, alpha-fetoprotein, and hepatitis  a, B and c screening recommended  - **Hepatitis C Screen Reflex to RNA FUTURE anytime; Future  - US Abdomen Limited; Future      Follow up with Provider - 1 month.   Call or return to the clinic with any  worsening of symptoms or no resolution. Patient/Parent verbalized understanding and is in agreement. Medication side effects reviewed.   Current Outpatient Prescriptions   Medication Sig Dispense Refill     hydrOXYzine (ATARAX) 25 MG tablet Take 2-4 tablets ( mg) by mouth nightly as needed for anxiety 30 tablet 1     sertraline (ZOLOFT) 100 MG tablet TAKE 1 TABLET(100 MG) BY MOUTH DAILY 90 tablet 1        See Patient Instructions    THADDEUS Hayes [facility]

## 2018-03-06 NOTE — LETTER
[facility]  [address] [place] [address]  [phone]        March 13, 2019    Elkin Mullen  [address] [address]              Dear Elkin Mullen    This is to remind you that you have non-fasting labs due.    You may call our office at [phone] to schedule an appointment.    Please disregard this notice if you have already had your labs drawn or made an appointment.        Sincerely,        Tamra Irizarry CNP/ yanick

## 2018-03-06 NOTE — MR AVS SNAPSHOT
After Visit Summary   3/6/2018    Elkin Mullen    MRN: [id number]           Patient Information     Date Of Birth          1990        Visit Information        Provider Department      3/6/2018 2:20 PM Tamra Irizarry APRN [facility]        Today's Diagnoses     LAURA (generalized anxiety disorder)    -  1    Panic attack          Care Instructions      Panic Attack  A panic attack is an extreme fear reaction that comes on for no clear reason. There is often a fear that something terrible will happen or that you may die. The attack may last a few minutes up to a few hours. Between attacks, things will seem quite normal. This condition has a psychological cause and can be treated with the help of a therapist or psychiatrist. Medicine can be very helpful for this problem.  Panic attacks usually come on suddenly, reaches a peak within minutes, and includes at least 4 of these symptoms:    Palpitations, pounding heart, or accelerated heart rate    Sweating    Crying    Trembling or shaking    Sensations of shortness of breath or smothering    Feelings of choking    Chest pain or discomfort    Nausea or abdominal distress    Feeling dizzy, unsteady, light-headed, or faint    Numbness or tingling sensations    Fear of dying    Fear of going crazy or of losing control    Feelings of unreality, strangeness, or detachment from the environment  Many of these symptoms can be linked to physical problems, so it is sometimes necessary to rule out conditions like thyroid disorders, heart disease, gastrointestinal problems, and others. They can also start as physical symptoms, but psychologically we may react to them in a fearful way, worsening the way we react and feel.  Home care    Try to find the sources of stress in your life. They may not be obvious. These may include:    Daily hassles of life which pile up (traffic jams, missed appointments, car troubles, etc.).    Major life  changes, both good (new baby, job promotion) and bad (loss of job, loss of loved one).    Feeling that you have too many responsibilities and can't take care of everything at once.    Helplessness: feeling like your problems are too much for you to handle.    Notice how your body reacts to stress. Learn to listen to your body signals so that you can take action before the stress becomes severe.    Try to be aware of what you were doing before the reaction started; this may give you clues to things that can trigger a reaction. It may be situations in your life, or what you were doing at the time.    When possible, avoid or reduce the cause of stress. Avoid hassles, limit the amount of change that is happening in your life at one time or take a break when you feel overloaded.    Unfortunately, many stressful situations cannot be avoided. Therefore, it is necessary to learn how to manage stress better. There are many proven methods that work and will reduce your anxiety. These include simple things like exercise, good nutrition and adequate rest. Also, there are certain techniques that are helpful: relaxation and breathing exercises, visualization, biofeedback, meditation or simply taking some time-out to clear your mind. For more information about this, ask your doctor or go to a local bookstore and review the many books and tapes available on this subject.  Follow-up care  Follow-up with your healthcare provider, or as advised.  Call 911  Call 911 if any of these occur:    Trouble breathing    Confusion    Very drowsy or trouble awakening    Fainting or loss of consciousness    Rapid heart rate    Seizure    New chest pain that becomes more severe, lasts longer, or spreads into your shoulder, arm, neck, jaw or back  When to seek medical advice  Call your healthcare provider right away if any of these occur:    Worsening of your symptoms to the point of feeling out-of-control    Increased pain with  breathing    Increasing feeling of weakness or dizziness    Cough with dark colored sputum (phlegm) or blood    Fever 1 degree above normal temperature ) lasting 24 to 48 hours or what your healthcare provider has advised    Swelling, pain or redness in one leg    Requests by family or friends for you to seek help for your symptoms  Date Last Reviewed: 9/29/2015 2000-2017 [employer]. [address] [place] [address]. All rights reserved. This information is not intended as a substitute for professional medical care. Always follow your healthcare professional's instructions.        Treating Anxiety Disorders with Therapy    If you have an anxiety disorder, you don t have to suffer anymore. Treatment is available. Therapy (also called counseling) is often a helpful treatment for anxiety disorders. With therapy, a specially trained professional (therapist) helps you face and learn to manage your anxiety. Therapy can be short-term or long-term depending on your needs. In some cases, medicine may also be prescribed with therapy. It may take time before you notice how much therapy is helping, but stick with it. With therapy, you can feel better.  Cognitive behavioral therapy (CBT)  Cognitive behavioral therapy (CBT) teaches you to manage anxiety. It does this by helping you understand how you think and act when you re anxious. Research has shown CBT to be a very effective treatment for anxiety disorders. How CBT is run is almost like a class. It involves homework and activities to build skills that teach you to cope with anxiety step by step. It can be done in a group or one-on-one, and often takes place for a set number of sessions. CBT has two main parts:    Cognitive therapy helps you identify the negative, irrational thoughts that occur with your anxiety. You ll learn to replace these with more positive, realistic thoughts.    Behavioral therapy helps you change how you react to anxiety.  You ll learn coping skills and methods for relaxing to help you better deal with anxiety.  Other forms of therapy  Other therapy methods may work better for you than CBT. Or, you may move from CBT to another form of therapy as your treatment needs change. This may mean meeting with a therapist by yourself or in a group. Therapy can also help you work through problems in your life, such as drug or alcohol dependence, that may be making your anxiety worse.  Getting better takes time  Therapy will help you feel better and teach you skills to help manage anxiety long term. But change doesn t happen right away. It takes a commitment from you. And treatment only works if you learn to face the causes of your anxiety. So, you might feel worse before you feel better. This can sometimes make it hard to stick with it. But remember: Therapy is a very effective treatment. The results will be well worth it.  Helping yourself  If anxiety is wearing you down, here are some things you can do to cope:    Check with your doctor and rule out any physical problems that may be causing the anxiety symptoms.    If an anxiety disorder is diagnosed, seek mental healthcare. This is an illness and it can respond to treatment. Most types of anxiety disorders will respond to talk therapy and medicine.    Educate yourself about anxiety disorders. Keep track of helpful online resources and books you can use during stressful periods.    Try stress management techniques such as meditation.    Consider online or in-person support groups.    Don t fight your feelings. Anxiety feeds itself. The more you worry about it, the worse it gets. Instead, try to identify what might have triggered your anxiety. Then try to put this threat in perspective.    Keep in mind that you can t control everything about a situation. Change what you can and let the rest take its course.    Exercise -- it s a great way to relieve tension and help your body feel  relaxed.    Examine your life for stress, and try to find ways to reduce it.    Avoid caffeine and nicotine, which can make anxiety symptoms worse.    Fight the temptation to turn to alcohol or unprescribed drugs for relief. They only make things worse in the long run.   Date Last Reviewed: 1/1/2017 2000-2017 [employer]. [address] [place] [address], [place] [address]. All rights reserved. This information is not intended as a substitute for professional medical care. Always follow your healthcare professional's instructions.        Treating Anxiety Disorders with Medicine  An anxiety disorder can make you feel nervous or apprehensive, even without a clear reason. In people age 65 and older, generalized anxiety disorder is one of the most commonly diagnosed anxiety disorders. Many times it occurs with depression. Certain anxiety disorders can cause intense feelings of fear or panic. You may even have physical symptoms such as a racing heartbeat, sweating, or dizziness. If you have these feelings, you don t have to suffer anymore. Treatment to help you overcome your fears will likely include therapy (also called counseling). Medicine may also be prescribed to help control your symptoms.    Medicines  Certain medicines may be prescribed to help control your symptoms. So you may feel less anxious. You may also feel able to move forward with therapy. At first, medicines and dosages may need to be adjusted to find what works best for you. Try to be patient. Tell your healthcare provider how a medicine makes you feel. This way, you can work together to find the treatment that s best for you. Keep in mind that medicines can have side effects. Talk with your provider about any side effects that are bothering you. Changing the dose or type of medicine may help. Don t stop taking medicine on your own. That can cause symptoms to come back.    Anti-anxiety medicine. This medicine eases symptoms and helps you relax.  Your healthcare provider will explain when and how to use it. It may be prescribed for use before situations that make you anxious. You may also be told to take medicine on a regular schedule. Anti-anxiety medicine may make you feel a little sleepy or  out of it.  Don t drive a car or operate machinery while on this medicine, until you know how it affects you.  Caution  Never use alcohol or other drugs with anti-anxiety medicines. This could result in loss of muscular control, sedation, coma, or death. Also, use only the amount of medicine prescribed for you. If you think you may have taken too much, get emergency care right away.     Antidepressant medicine. This kind of medicine is often used to treat anxiety, even if you aren t depressed. An antidepressant helps balance out brain chemicals. This helps keep anxiety under control. This medicine is taken on a schedule. It takes a few weeks to start working. If you don t notice a change at first, you may just need more time. But if you don t notice results after the first few weeks, tell your provider.  Keep taking medicines as prescribed  Never change your dosage, share or use another person's medicine, or stop taking your medicines without talking to your healthcare provider first. Keep the following in mind:    Some medicines must be taken on a schedule. Make this part of your daily routine. For instance, always take your pill before brushing your teeth. A pillbox can help you remember if you ve taken your medicine each day.    Medicines are often taken for 6 to 12 months. Your healthcare provider will then evaluate whether you need to stay on them. Many people who have also had therapy may no longer need medicine to manage anxiety.    You may need to stop taking medicine slowly to give your body time to adjust. When it s time to stop, your healthcare provider will tell you more. Remember: Never stop taking your medicine without talking to your provider  first.    If symptoms return, you may need to start taking medicines again. This isn t your fault. It s just the nature of your anxiety disorder.  Special concerns    Side effects. Medicines may cause side effects. Ask your healthcare provider or pharmacist what you can expect. They may have ideas for avoiding some side effects.    Sexual problems. Some antidepressants can affect your desire for sex or your ability to have an orgasm. A change in dosage or medicine often solves the problem. If you have a sexual side effect that concerns you, tell your healthcare provider.    Addiction. If you ve never had a problem with drugs or alcohol, you may not have a problem with medicines used to treat anxiety disorders. But always discuss the medicines with your healthcare provider before taking them. If you have a history of addiction, you may not be able to use certain medicines used to treat anxiety disorders.    Medicine interactions. Always check with your pharmacist before using any over-the-counter medicines, including herbal supplements.   Date Last Reviewed: 5/1/2017 2000-2017 [employer]. [address] [place] [address]. All rights reserved. This information is not intended as a substitute for professional medical care. Always follow your healthcare professional's instructions.                Follow-ups after your visit        Who to contact     If you have questions or need follow up information about today's clinic visit or your schedule please contact [facility] directly at [phone].  Normal or non-critical lab and imaging results will be communicated to you by MyChart, letter or phone within 4 business days after the clinic has received the results. If you do not hear from us within 7 days, please contact the clinic through MyChart or phone. If you have a critical or abnormal lab result, we will notify you by phone as soon as possible.  Submit refill requests  "through [employer] or call your pharmacy and they will forward the refill request to us. Please allow 3 business days for your refill to be completed.          Additional Information About Your Visit        Virtual Call CenterharCreateTrips Information     [employer] lets you send messages to your doctor, view your test results, renew your prescriptions, schedule appointments and more. To sign up, go to [email or web address] . Click on \"Log in\" on the left side of the screen, which will take you to the Welcome page. Then click on \"Sign up Now\" on the right side of the page.     You will be asked to enter the access code listed below, as well as some personal information. Please follow the directions to create your username and password.     Your access code is: [email or web address]  Expires: 2018  3:06 PM     Your access code will  in 90 days. If you need help or a new code, please call your [facility] clinic or [phone].        Care EveryWhere ID     This is your Care EveryWhere ID. This could be used by other organizations to access your [facility] medical records  [id number]        Your Vitals Were     Pulse Temperature Respirations Height Pulse Oximetry BMI (Body Mass Index)    68 97.3  F (36.3  C) (Tympanic) 14 5' 10\" (1.778 m) 99% 30.71 kg/m2       Blood Pressure from Last 3 Encounters:   18 130/88   05/10/17 120/77   16 128/77    Weight from Last 3 Encounters:   18 214 lb (97.1 kg)   05/10/17 215 lb (97.5 kg)   16 197 lb (89.4 kg)              We Performed the Following     CBC with platelets     Comprehensive metabolic panel (BMP + Alb, Alk Phos, ALT, AST, Total. Bili, TP)     LDL cholesterol direct     TSH with free T4 reflex          Today's Medication Changes          These changes are accurate as of 3/6/18  3:07 PM.  If you have any questions, ask your nurse or doctor.               Start taking these medicines.        Dose/Directions    hydrOXYzine 25 MG tablet   Commonly known as:  ATARAX   Used for:  " Panic attack, LAURA (generalized anxiety disorder)   Started by:  Tamra Irizarry APRN CNP        Dose:   mg   Take 2-4 tablets ( mg) by mouth nightly as needed for anxiety   Quantity:  30 tablet   Refills:  1         These medicines have changed or have updated prescriptions.        Dose/Directions    sertraline 100 MG tablet   Commonly known as:  ZOLOFT   This may have changed:  See the new instructions.   Used for:  LAURA (generalized anxiety disorder)   Changed by:  Tamra Irizarry APRN CNP        TAKE 1 TABLET(100 MG) BY MOUTH DAILY   Quantity:  90 tablet   Refills:  1            Where to get your medicines      These medications were sent to [facility] [address] [address]     Phone:  [phone]     hydrOXYzine 25 MG tablet    sertraline 100 MG tablet                Primary Care Provider Fax #    Physician No Ref-Primary [phone]       No address on file        Equal Access to Services     [facility] : Hadii aad ku hadasho Soomaali, waaxda luqadaha, qaybta kaalmada adeegyada, waxay juliannin lucía fuller . So [facility] [phone].    ATENCIÓN: Si habla español, tiene a pa disposición servicios gratuitos de asistencia lingüística. Facundoeben al [phone].    We comply with applicable federal civil rights laws and [place] laws. We do not discriminate on the basis of race, color, national origin, age, disability, sex, sexual orientation, or gender identity.            Thank you!     Thank you for choosing [facility]  for your care. Our goal is always to provide you with excellent care. Hearing back from our patients is one way we can continue to improve our services. Please take a few minutes to complete the written survey that you may receive in the mail after your visit with us. Thank you!             Your Updated Medication List - Protect others around you: Learn how to  safely use, store and throw away your medicines at www.disposemymeds.org.          This list is accurate as of 3/6/18  3:07 PM.  Always use your most recent med list.                   Brand Name Dispense Instructions for use Diagnosis    hydrOXYzine 25 MG tablet    ATARAX    30 tablet    Take 2-4 tablets ( mg) by mouth nightly as needed for anxiety    Panic attack, LAURA (generalized anxiety disorder)       sertraline 100 MG tablet    ZOLOFT    90 tablet    TAKE 1 TABLET(100 MG) BY MOUTH DAILY    LAURA (generalized anxiety disorder)

## 2018-03-06 NOTE — NURSING NOTE
"Chief Complaint   Patient presents with     Depression       Initial There were no vitals taken for this visit. Estimated body mass index is 30.41 kg/(m^2) as calculated from the following:    Height as of 5/10/17: 5' 10.5\" (1.791 m).    Weight as of 5/10/17: 215 lb (97.5 kg).      Health Maintenance that is potentially due pending provider review:  NONE    Possibly completing today per provider review.    Is there anyone who you would like to be able to receive your results? No  If yes have patient fill out JANKI    "

## 2018-03-07 LAB
ALBUMIN SERPL-MCNC: 4.3 G/DL (ref 3.4–5)
ALP SERPL-CCNC: 64 U/L (ref 40–150)
ALT SERPL W P-5'-P-CCNC: 87 U/L (ref 0–70)
ANION GAP SERPL CALCULATED.3IONS-SCNC: 6 MMOL/L (ref 3–14)
AST SERPL W P-5'-P-CCNC: 57 U/L (ref 0–45)
BILIRUB SERPL-MCNC: 0.5 MG/DL (ref 0.2–1.3)
BUN SERPL-MCNC: 17 MG/DL (ref 7–30)
CALCIUM SERPL-MCNC: 9.1 MG/DL (ref 8.5–10.1)
CHLORIDE SERPL-SCNC: 106 MMOL/L (ref 94–109)
CO2 SERPL-SCNC: 25 MMOL/L (ref 20–32)
CREAT SERPL-MCNC: 0.79 MG/DL (ref 0.66–1.25)
GFR SERPL CREATININE-BSD FRML MDRD: >90 ML/MIN/1.7M2
GLUCOSE SERPL-MCNC: 96 MG/DL (ref 70–99)
LDLC SERPL DIRECT ASSAY-MCNC: 52 MG/DL
POTASSIUM SERPL-SCNC: 4.9 MMOL/L (ref 3.4–5.3)
PROT SERPL-MCNC: 7.6 G/DL (ref 6.8–8.8)
SODIUM SERPL-SCNC: 137 MMOL/L (ref 133–144)
TSH SERPL DL<=0.005 MIU/L-ACNC: 1.86 MU/L (ref 0.4–4)

## 2018-03-07 ASSESSMENT — ANXIETY QUESTIONNAIRES: GAD7 TOTAL SCORE: 19

## 2018-03-07 ASSESSMENT — PATIENT HEALTH QUESTIONNAIRE - PHQ9: SUM OF ALL RESPONSES TO PHQ QUESTIONS 1-9: 5

## 2018-03-08 ENCOUNTER — TELEPHONE (OUTPATIENT)
Dept: FAMILY MEDICINE | Facility: CLINIC | Age: 28
End: 2018-03-08

## 2018-03-08 DIAGNOSIS — R79.89 ELEVATED LIVER FUNCTION TESTS: Primary | ICD-10-CM

## 2018-03-08 NOTE — TELEPHONE ENCOUNTER
Pt called he has been drinking 5-6 beers a night and whiskey. States he feels that he can stop at any time. Advised he should stop drinking alcohol at this time. Advised if he is having any issues with stopping to call us so that we can assist. Savannah Costa RN

## 2018-03-08 NOTE — TELEPHONE ENCOUNTER
Pt is returning call and has further questions about his elevated lab work.  Tricia Orn [place] Sec

## 2018-03-23 ENCOUNTER — TELEPHONE (OUTPATIENT)
Dept: FAMILY MEDICINE | Facility: CLINIC | Age: 28
End: 2018-03-23

## 2018-03-23 NOTE — TELEPHONE ENCOUNTER
We sent this patient an overdue lab letter on 2/9/18. If they need these results please contact the patient and place new orders.  Thank you  Op lab

## 2018-03-26 NOTE — TELEPHONE ENCOUNTER
Noted that lab has sent letter to patient about overdue labs.  If patient does not contact clinic back in 2 weeks, please try calling or send letter again.

## 2018-04-02 NOTE — TELEPHONE ENCOUNTER
It has only been one week [facility].  As of today, patient has not completed labs.  Will postpone one more week as MD below states 2 weeks.    Radha GAMBINO RN

## 2018-12-07 ENCOUNTER — OFFICE VISIT (OUTPATIENT)
Dept: FAMILY MEDICINE | Facility: CLINIC | Age: 28
End: 2018-12-07
Payer: COMMERCIAL

## 2018-12-07 VITALS
SYSTOLIC BLOOD PRESSURE: 118 MMHG | TEMPERATURE: 97.6 F | BODY MASS INDEX: 32.93 KG/M2 | HEIGHT: 70 IN | DIASTOLIC BLOOD PRESSURE: 64 MMHG | WEIGHT: 230 LBS | HEART RATE: 80 BPM | RESPIRATION RATE: 18 BRPM

## 2018-12-07 DIAGNOSIS — B07.8 COMMON WART: Primary | ICD-10-CM

## 2018-12-07 PROCEDURE — 17110 DESTRUCTION B9 LES UP TO 14: CPT | Performed by: NURSE PRACTITIONER

## 2018-12-07 NOTE — MR AVS SNAPSHOT
After Visit Summary   12/7/2018    Elkin Mullen    MRN: [id number]           Patient Information     Date Of Birth          1990        Visit Information        Provider Department      12/7/2018 9:40 AM Leslie Estes APRN [facility]        Care Instructions      Plantar Warts  Warts are common skin growths that can appear anywhere on the body. Warts on the soles of the feet are called plantar warts. These warts are not a serious health problem. They usually go away without treatment. But plantar warts can be painful when you stand or walk. If this is the case, special cushions can help relieve pressure and pain. Drugstores carry these cushions and you can buy them without a prescription. If cushions don't work and the pain interferes with walking, the wart can be removed.  General care    Your healthcare provider may remove the plantar wart:  ? With prescription medicines. These may be placed directly on the wart at each office visit. Or you may be sent home with the medicine.  ? With a blade, or by freezing (cryotherapy), burning (electrocautery), or laser treatment.    You may be instructed to treat the wart yourself at home using an over-the-counter wart-removal medicine (such as 40% salicylic acid). Apply the medicine to the wart every day as directed. Don't apply the medicine to the healthy skin around the wart. In between applications, remove the dead wart tissue using the type of file suggested by your healthcare provider. You will likely need to repeat this process for several weeks to remove the entire wart.    Warts can spread from your foot to other parts of your body and to other people. Don't scratch or pick at the wart. Wash your hands well before and after touching your warts. If your child has warts, be certain to teach him or her proper hand washing techniques as well.    While many home remedies are suggested for warts, it's best to check with your  healthcare provider before trying them.    Warts often come back, even after successful treatment. Return promptly for treatment of any new warts.  Follow-up care  Follow up with your healthcare provider, or as advised.     When to seek medical advice    Signs of infection (red streaks, pus, smelly or colored discharge, or fever) appear    You have heavy bleeding or bleeding that won t stop with light pressure    The wart doesn t go away after several weeks of self-care    New warts appear on feet, hands, or face         Date Last Reviewed: 5/1/2018 2000-2018 [employer]. [address] [place] [id number]. All rights reserved. This information is not intended as a substitute for professional medical care. Always follow your healthcare professional's instructions.        Genital Warts  Genital warts are painless skin bumps in your genital area. You may have a single wart or several grouped together. They can have a flat or rough surface. Genital warts can appear on the penis, scrotum, vagina, vulva, or anus.  Genital warts are caused by the human papillomavirus (HPV). HPV is the most common sexually transmitted disease in the [place] Most people who become infected with HPV will not have any warts, but they can still pass the virus on to another person. HPV is passed on through skin contact with a wart during sex.  It can take 1 to 6 months for warts to appear after you are exposed to the virus. But sometimes a wart may not appear until years later. Genital warts can grow at different rates. Warts may grow and spread faster in people who are pregnant and who have a weak immune system.  About 30 types of HPV can cause genital warts. Most of these types cause no other problems. But a few types of HPV can infect a woman s cervix. If a cervical infection with one of these viruses goes undiagnosed and untreated, the woman is at higher risk for cervical cancer. It takes many months to years for signs  of cancer to develop from an HPV virus infection of the cervix. Women who have genital warts and women who have a partner with genital warts should get regular Pap tests. These tests look for changes that may lead to cervical cancer.  Most warts go away on their own in 1 to 2 years. But it s important to consider treatment. Treatment can:    Make the warts go away sooner    Lower the risk of spreading HPV to others    Lower the risk for cervical cancer if you re a woman  Warts can be treated by freezing them, cutting them, removing them with a laser, using a prescription cream (imiquimod) to boost the immune system around the warts, or applying a liquid or gel to dissolve them. You may need more than 1 treatment session to make the warts go away. But even after the warts are gone, the virus remains in your skin. The warts may reappear. Be sure to let your healthcare provider know if there's any chance that you could be pregnant before starting treatment.  You can get an HPV vaccine. The vaccine protects against certain types of HPV that cause genital warts and cervical cancer. Even though you have HPV now, being vaccinated could still help protect you from getting other types of HPV in the future. This is true for your partner as well. Ask your healthcare provider if getting vaccinated makes sense for you. Urge your partner to also ask about getting vaccinated.  Home care  Follow these tips to care for yourself at home:    Apply an ointment or gel prescribed for you exactly as directed. Be careful not to get the ointment or gel on nearby healthy skin.    After applying the ointment or gel, keep the area clean and dry. Wear loose-fitting cotton underwear to avoid chafing.    If you have pain after treatment, sit in a tub with a few inches of warm water for 20 minutes. Do this 3 times a day for the next 2 to 3 days. Add a cup of cornstarch or baking soda, or a packet of colloidal oatmeal or astringent solution powder,  to the water. This will help soothe your skin.  How to prevent passing on the virus  The HPV virus is passed on to other people by having sex with someone who is infected. The risk of passing on the virus is greatest when you have warts. But there is a chance of spreading the virus even after treatment, when the wart can t be seen. Condoms offer only limited protection from the warts. This is because HPV can infect skin in the genital area not covered by the condom.  Partners usually have the same type of HPV, so it s probably not necessary to refrain from sex with your current partner. But don t have sex with any new partner until all visible warts are gone. If you re a man, tell all current and future sexual partners that you have had genital warts. This way, the woman can be sure to have regular Pap test.  Follow-up care  When you first learn that you have genital warts, you may feel guilty, angry, or emotionally upset. Getting the facts helps put you back in control. Follow up with your healthcare provider or your local public health department. You can get a complete STD screening, including HIV testing. For more information, call the national STD hotline at 545-985-0193. After treatment, you should be rechecked in about 3 months to make sure all warts are gone.  When to seek medical advice  Call your healthcare provider right away if any of these occur after treating a wart:    Redness or burning on your skin that doesn t stop within a few hours    Swelling of the skin around the treated area    Pus draining from the treated area  Otherwise, get prompt medical attention if you have:    Difficulty urinating or having a bowel movement    Fluid coming from the vagina or penis    Skin rash or pain in a joint  Date Last Reviewed: 9/1/2016 2000-2018 [employer]. [address] [place] [address], [place] [address]. All rights reserved. This information is not intended as a substitute for professional medical  care. Always follow your healthcare professional's instructions.        Treating Warts    You and your healthcare provider can talk about what treatment may be best for your wart or warts. To get rid of your warts, your healthcare provider may need to try more than one type of treatment. The methods described below are often used to treat warts.  Types of treatment    Do nothing. Most warts will resolve within 2 years, even without treatment. So doing nothing is sometimes a good option. This is particularly true for smaller warts that are not causing symptoms.    Cryotherapy (liquid nitrogen). This kills skin cells by freezing them. It kills the warts and destroys skin infected by the wart-causing virus. This is done in your healthcare provider s office and will cause some discomfort. It may take several treatments over several weeks to get rid of the warts.    Topical medicines. Prescribed topical medicines can be put on the skin. These are usually applied in the healthcare provider's office. But some prescriptions may be applied at home.    Over-the-counter (OTC) topical treatments. OTC medicines that most often contain salicylic acid may be an option. These patches, liquids, and creams are used at home. The medicine is applied daily to the wart and nearby skin. It's usually left on overnight. The dead skin is filed down the next day. In 1 to 3 days, the procedure can be repeated. Topical treatments are sometimes combined with cryotherapy.    Electrodessication and curettage (ED & C).  For this procedure, the healthcare provider applies numbing medicine to the wart. Then the wart is scraped or cut off. This type of treatment is usually not the first line of therapy.    Laser surgery.  This can vaporize wart tissue or destroy the blood vessels that feed the wart. This is done in the healthcare provider's office.    Shots (injections). These can be used to treat warts that don t respond to other treatments, such as  stubborn or painful warts around the nails. This is done in the healthcare provider s office.  When to seek medical treatment  It s a good idea to have your healthcare provider check your warts. That way your provider can rule out any other skin problems. Sometimes a callous or a corn can look like a wart, but the treatments may differ. Treatment can also provide relief from warts that bleed, burn, hurt, or itch. Genital warts should always be treated. They can spread to other people through sexual contact. And they may cause genital or cervical cancer.  Getting good results  After having your warts treated, new warts may still appear. Don t be discouraged. Warts often come back. See your healthcare provider again to discuss this. Your provider can tell you about the treatments that most likely will help clear your skin of warts.   Date Last Reviewed: 2/1/2017 2000-2018 [employer]. [address] [place] [address]. All rights reserved. This information is not intended as a substitute for professional medical care. Always follow your healthcare professional's instructions.        What Are Warts?    Warts are common skin growths that can appear anywhere on the body. There are many types of warts. In most cases, they are benign (not cancer) and harmless. But warts can be embarrassing. And some warts are painful. The good news is that they can be treated.  Who gets warts?  Warts are most common in children and teens. But they can occur at any age. They are also more common in certain jobs, such as those that involve handling meat, poultry, or fish. A weakened immune system may make you more likely to have warts.  What causes warts?  Warts are caused by the human papillomavirus (HPV). There are over 150 types of HPV. This virus can spread between people. But you can be exposed to the virus and not get warts. Warts tend to form where skin is damaged or broken. But they can also appear elsewhere.  Left untreated, warts can grow in number. They can also spread to other parts of the body.  Types of warts  There are many types of warts. Some of the most common ones are described below:    Common warts. These have a raised, rough surface. Enlarged blood vessels in the warts look like dots on the warts  surface. Common warts form mainly on the hands, but can appear on other parts of the body.    Plantar warts. These are warts on the soles of the feet. When you stand or walk, pressure makes plantar warts painful. When they form in clusters, plantar warts are called mosaic warts.    Periungual warts. These form under and around fingernails. People who bite their nails are more at risk.    Filiform warts. These are slender, fingerlike growths that can dangle from the skin. They most often appear on the face and neck.    Flat warts. These are small, smooth growths. They tend to form in clusters on the face, backs of the hands, or legs.    Genital warts. These can appear on or around the genitals. These warts can spread and are linked to cervical, anal, and other cancers. So it is important to have them treated quickly and to discuss these with sexual partners.  Date Last Reviewed: 2/1/2017 2000-2018 [employer]. [address] [place] [address], [place] [address]. All rights reserved. This information is not intended as a substitute for professional medical care. Always follow your healthcare professional's instructions.                Follow-ups after your visit        Who to contact     If you have questions or need follow up information about today's clinic visit or your schedule please contact [facility] directly at [phone].  Normal or non-critical lab and imaging results will be communicated to you by MyChart, letter or phone within 4 business days after the clinic has received the results. If you do not hear from us within 7 days, please contact the clinic through MyChart or phone.  "If you have a critical or abnormal lab result, we will notify you by phone as soon as possible.  Submit refill requests through [employer] or call your pharmacy and they will forward the refill request to us. Please allow 3 business days for your refill to be completed.          Additional Information About Your Visit        Care EveryWhere ID     This is your Care EveryWhere ID. This could be used by other organizations to access your [facility] medical records  [id number]        Your Vitals Were     Pulse Temperature Respirations Height BMI (Body Mass Index)       80 97.6  F (36.4  C) (Tympanic) 18 5' 10\" (1.778 m) 33 kg/m2        Blood Pressure from Last 3 Encounters:   12/07/18 118/64   03/06/18 130/88   05/10/17 120/77    Weight from Last 3 Encounters:   12/07/18 230 lb (104.3 kg)   03/06/18 214 lb (97.1 kg)   05/10/17 215 lb (97.5 kg)              Today, you had the following     No orders found for display       Primary Care Provider Fax #    Physician No Ref-Primary [phone]       No address on file        Equal Access to Services     [facility] : Hadsindhu Reyes, wajeetda desiree, qacatalina ha, jackie fuller . So [facility] [phone].    ATENCIÓN: Si habla español, tiene a pa disposición servicios gratuitos de asistencia lingüística. [facility] [phone].    We comply with applicable federal civil rights laws and [place] laws. We do not discriminate on the basis of race, color, national origin, age, disability, sex, sexual orientation, or gender identity.            Thank you!     Thank you for choosing [facility]  for your care. Our goal is always to provide you with excellent care. Hearing back from our patients is one way we can continue to improve our services. Please take a few minutes to complete the written survey that you may receive in the mail after your visit with us. Thank you!             Your Updated Medication List - " Protect others around you: Learn how to safely use, store and throw away your medicines at www.disposemymeds.org.          This list is accurate as of 12/7/18 10:10 AM.  Always use your most recent med list.                   Brand Name Dispense Instructions for use Diagnosis    sertraline 100 MG tablet    ZOLOFT    90 tablet    TAKE 1 TABLET(100 MG) BY MOUTH DAILY    LAURA (generalized anxiety disorder)

## 2018-12-07 NOTE — NURSING NOTE
"Chief Complaint   Patient presents with     Wart       Initial /64 (BP Location: Right arm, Cuff Size: Adult Large)  Pulse 80  Temp 97.6  F (36.4  C) (Tympanic)  Resp 18  Ht 5' 10\" (1.778 m)  Wt 230 lb (104.3 kg)  BMI 33 kg/m2 Estimated body mass index is 33 kg/(m^2) as calculated from the following:    Height as of this encounter: 5' 10\" (1.778 m).    Weight as of this encounter: 230 lb (104.3 kg).    Patient presents to the clinic using No DME    Health Maintenance that is potentially due pending provider review:  NONE      Is there anyone who you would like to be able to receive your results? No  If yes have patient fill out JANKI      "

## 2018-12-07 NOTE — PROGRESS NOTES
SUBJECTIVE:   Elkin Mullen is a 28 year old male who presents to clinic today for the following health issues:    WART(S)  Onset: several years    Description:   Location: right hand, one on left hand  Number of warts: Patient is not sure. There are several 11 warts right   Painful: no    Accompanying Signs & Symptoms:  Signs of infection: no    History:   History of trauma: no  Prior warts: no    Therapies Tried and outcome: Several different OTC medicines, apple cider vinegar      Problem list and histories reviewed & adjusted, as indicated.  Additional history: as documented    Patient Active Problem List   Diagnosis     Anxiety     CARDIOVASCULAR SCREENING; LDL GOAL LESS THAN 160     BMI 30.0-30.9,adult     LAURA (generalized anxiety disorder)     History reviewed. No pertinent surgical history.    Social History     Tobacco Use     Smoking status: Former Smoker     Smokeless tobacco: Former User   Substance Use Topics     Alcohol use: Yes     Comment: weekends     Family History   Problem Relation Age of Onset     Diabetes Maternal Grandmother          Current Outpatient Medications   Medication Sig Dispense Refill     sertraline (ZOLOFT) 100 MG tablet TAKE 1 TABLET(100 MG) BY MOUTH DAILY 90 tablet 1     No Known Allergies  Recent Labs   Lab Test 03/06/18  1512   LDL 52   ALT 87*   CR 0.79   GFRESTIMATED >90   GFRESTBLACK >90   POTASSIUM 4.9   TSH 1.86      BP Readings from Last 3 Encounters:   12/07/18 118/64   03/06/18 130/88   05/10/17 120/77    Wt Readings from Last 3 Encounters:   12/07/18 104.3 kg (230 lb)   03/06/18 97.1 kg (214 lb)   05/10/17 97.5 kg (215 lb)                    Reviewed and updated as needed this visit by clinical staff       Reviewed and updated as needed this visit by Provider         ROS:  Constitutional, HEENT, cardiovascular, pulmonary, gi and gu systems are negative, except as otherwise noted.    OBJECTIVE:     /64 (BP Location: Right arm, Cuff Size: Adult Large)   Pulse 80  "  Temp 97.6  F (36.4  C) (Tympanic)   Resp 18   Ht 1.778 m (5' 10\")   Wt 104.3 kg (230 lb)   BMI 33.00 kg/m    Body mass index is 33 kg/m .  GENERAL: healthy, alert and no distress  EYES: Eyes grossly normal to inspection, PERRL and conjunctivae and sclerae normal  RESP: lungs clear to auscultation - no rales, rhonchi or wheezes  CV: regular rate and rhythm, normal S1 S2, no S3 or S4, no murmur, click or rub, no peripheral edema and peripheral pulses strong  SKIN: no suspicious lesions or rashes  NEURO: Normal strength and tone, mentation intact and speech normal  PSYCH: mentation appears normal, affect normal/bright  S 1 wart noted to the left palmar handkin:.  11 warts noted to the the right hand and fingers    Liquid nitrogen was applied for 5-10 seconds x3  to the skin lesions and the expected blistering or scabbing reaction explained. Do not pick at the areas. Patient reminded to expect hypopigmented scars from the procedure. Return if lesions fail to fully resolve.        ASSESSMENT/PLAN:   .(B07.8) Common wart  (primary encounter diagnosis)  Comment:   Plan: DESTRUCT PREMALIGNANT LESION, 2-14    THADDEUS Bernabe [facility]  "

## 2018-12-07 NOTE — PATIENT INSTRUCTIONS
Plantar Warts  Warts are common skin growths that can appear anywhere on the body. Warts on the soles of the feet are called plantar warts. These warts are not a serious health problem. They usually go away without treatment. But plantar warts can be painful when you stand or walk. If this is the case, special cushions can help relieve pressure and pain. Drugstores carry these cushions and you can buy them without a prescription. If cushions don't work and the pain interferes with walking, the wart can be removed.  General care    Your healthcare provider may remove the plantar wart:  ? With prescription medicines. These may be placed directly on the wart at each office visit. Or you may be sent home with the medicine.  ? With a blade, or by freezing (cryotherapy), burning (electrocautery), or laser treatment.    You may be instructed to treat the wart yourself at home using an over-the-counter wart-removal medicine (such as 40% salicylic acid). Apply the medicine to the wart every day as directed. Don't apply the medicine to the healthy skin around the wart. In between applications, remove the dead wart tissue using the type of file suggested by your healthcare provider. You will likely need to repeat this process for several weeks to remove the entire wart.    Warts can spread from your foot to other parts of your body and to other people. Don't scratch or pick at the wart. Wash your hands well before and after touching your warts. If your child has warts, be certain to teach him or her proper hand washing techniques as well.    While many home remedies are suggested for warts, it's best to check with your healthcare provider before trying them.    Warts often come back, even after successful treatment. Return promptly for treatment of any new warts.  Follow-up care  Follow up with your healthcare provider, or as advised.     When to seek medical advice    Signs of infection (red streaks, pus, smelly or colored  discharge, or fever) appear    You have heavy bleeding or bleeding that won t stop with light pressure    The wart doesn t go away after several weeks of self-care    New warts appear on feet, hands, or face         Date Last Reviewed: 5/1/2018 2000-2018 [employer]. [address] [place] [id number]. All rights reserved. This information is not intended as a substitute for professional medical care. Always follow your healthcare professional's instructions.        Genital Warts  Genital warts are painless skin bumps in your genital area. You may have a single wart or several grouped together. They can have a flat or rough surface. Genital warts can appear on the penis, scrotum, vagina, vulva, or anus.  Genital warts are caused by the human papillomavirus (HPV). HPV is the most common sexually transmitted disease in the [place] Most people who become infected with HPV will not have any warts, but they can still pass the virus on to another person. HPV is passed on through skin contact with a wart during sex.  It can take 1 to 6 months for warts to appear after you are exposed to the virus. But sometimes a wart may not appear until years later. Genital warts can grow at different rates. Warts may grow and spread faster in people who are pregnant and who have a weak immune system.  About 30 types of HPV can cause genital warts. Most of these types cause no other problems. But a few types of HPV can infect a woman s cervix. If a cervical infection with one of these viruses goes undiagnosed and untreated, the woman is at higher risk for cervical cancer. It takes many months to years for signs of cancer to develop from an HPV virus infection of the cervix. Women who have genital warts and women who have a partner with genital warts should get regular Pap tests. These tests look for changes that may lead to cervical cancer.  Most warts go away on their own in 1 to 2 years. But it s important to  consider treatment. Treatment can:    Make the warts go away sooner    Lower the risk of spreading HPV to others    Lower the risk for cervical cancer if you re a woman  Warts can be treated by freezing them, cutting them, removing them with a laser, using a prescription cream (imiquimod) to boost the immune system around the warts, or applying a liquid or gel to dissolve them. You may need more than 1 treatment session to make the warts go away. But even after the warts are gone, the virus remains in your skin. The warts may reappear. Be sure to let your healthcare provider know if there's any chance that you could be pregnant before starting treatment.  You can get an HPV vaccine. The vaccine protects against certain types of HPV that cause genital warts and cervical cancer. Even though you have HPV now, being vaccinated could still help protect you from getting other types of HPV in the future. This is true for your partner as well. Ask your healthcare provider if getting vaccinated makes sense for you. Urge your partner to also ask about getting vaccinated.  Home care  Follow these tips to care for yourself at home:    Apply an ointment or gel prescribed for you exactly as directed. Be careful not to get the ointment or gel on nearby healthy skin.    After applying the ointment or gel, keep the area clean and dry. Wear loose-fitting cotton underwear to avoid chafing.    If you have pain after treatment, sit in a tub with a few inches of warm water for 20 minutes. Do this 3 times a day for the next 2 to 3 days. Add a cup of cornstarch or baking soda, or a packet of colloidal oatmeal or astringent solution powder, to the water. This will help soothe your skin.  How to prevent passing on the virus  The HPV virus is passed on to other people by having sex with someone who is infected. The risk of passing on the virus is greatest when you have warts. But there is a chance of spreading the virus even after treatment,  when the wart can t be seen. Condoms offer only limited protection from the warts. This is because HPV can infect skin in the genital area not covered by the condom.  Partners usually have the same type of HPV, so it s probably not necessary to refrain from sex with your current partner. But don t have sex with any new partner until all visible warts are gone. If you re a man, tell all current and future sexual partners that you have had genital warts. This way, the woman can be sure to have regular Pap test.  Follow-up care  When you first learn that you have genital warts, you may feel guilty, angry, or emotionally upset. Getting the facts helps put you back in control. Follow up with your healthcare provider or your local public health department. You can get a complete STD screening, including HIV testing. For more information, call the national STD hotline at 204-440-8888. After treatment, you should be rechecked in about 3 months to make sure all warts are gone.  When to seek medical advice  Call your healthcare provider right away if any of these occur after treating a wart:    Redness or burning on your skin that doesn t stop within a few hours    Swelling of the skin around the treated area    Pus draining from the treated area  Otherwise, get prompt medical attention if you have:    Difficulty urinating or having a bowel movement    Fluid coming from the vagina or penis    Skin rash or pain in a joint  Date Last Reviewed: 9/1/2016 2000-2018 [employer]. [address] [place] [address]. All rights reserved. This information is not intended as a substitute for professional medical care. Always follow your healthcare professional's instructions.        Treating Warts    You and your healthcare provider can talk about what treatment may be best for your wart or warts. To get rid of your warts, your healthcare provider may need to try more than one type of treatment. The methods  described below are often used to treat warts.  Types of treatment    Do nothing. Most warts will resolve within 2 years, even without treatment. So doing nothing is sometimes a good option. This is particularly true for smaller warts that are not causing symptoms.    Cryotherapy (liquid nitrogen). This kills skin cells by freezing them. It kills the warts and destroys skin infected by the wart-causing virus. This is done in your healthcare provider s office and will cause some discomfort. It may take several treatments over several weeks to get rid of the warts.    Topical medicines. Prescribed topical medicines can be put on the skin. These are usually applied in the healthcare provider's office. But some prescriptions may be applied at home.    Over-the-counter (OTC) topical treatments. OTC medicines that most often contain salicylic acid may be an option. These patches, liquids, and creams are used at home. The medicine is applied daily to the wart and nearby skin. It's usually left on overnight. The dead skin is filed down the next day. In 1 to 3 days, the procedure can be repeated. Topical treatments are sometimes combined with cryotherapy.    Electrodessication and curettage (ED & C).  For this procedure, the healthcare provider applies numbing medicine to the wart. Then the wart is scraped or cut off. This type of treatment is usually not the first line of therapy.    Laser surgery.  This can vaporize wart tissue or destroy the blood vessels that feed the wart. This is done in the healthcare provider's office.    Shots (injections). These can be used to treat warts that don t respond to other treatments, such as stubborn or painful warts around the nails. This is done in the healthcare provider s office.  When to seek medical treatment  It s a good idea to have your healthcare provider check your warts. That way your provider can rule out any other skin problems. Sometimes a callous or a corn can look like a  wart, but the treatments may differ. Treatment can also provide relief from warts that bleed, burn, hurt, or itch. Genital warts should always be treated. They can spread to other people through sexual contact. And they may cause genital or cervical cancer.  Getting good results  After having your warts treated, new warts may still appear. Don t be discouraged. Warts often come back. See your healthcare provider again to discuss this. Your provider can tell you about the treatments that most likely will help clear your skin of warts.   Date Last Reviewed: 2/1/2017 2000-2018 [employer]. [address] [place] [address], [place] [address]. All rights reserved. This information is not intended as a substitute for professional medical care. Always follow your healthcare professional's instructions.        What Are Warts?    Warts are common skin growths that can appear anywhere on the body. There are many types of warts. In most cases, they are benign (not cancer) and harmless. But warts can be embarrassing. And some warts are painful. The good news is that they can be treated.  Who gets warts?  Warts are most common in children and teens. But they can occur at any age. They are also more common in certain jobs, such as those that involve handling meat, poultry, or fish. A weakened immune system may make you more likely to have warts.  What causes warts?  Warts are caused by the human papillomavirus (HPV). There are over 150 types of HPV. This virus can spread between people. But you can be exposed to the virus and not get warts. Warts tend to form where skin is damaged or broken. But they can also appear elsewhere. Left untreated, warts can grow in number. They can also spread to other parts of the body.  Types of warts  There are many types of warts. Some of the most common ones are described below:    Common warts. These have a raised, rough surface. Enlarged blood vessels in the warts look like dots on the  warts  surface. Common warts form mainly on the hands, but can appear on other parts of the body.    Plantar warts. These are warts on the soles of the feet. When you stand or walk, pressure makes plantar warts painful. When they form in clusters, plantar warts are called mosaic warts.    Periungual warts. These form under and around fingernails. People who bite their nails are more at risk.    Filiform warts. These are slender, fingerlike growths that can dangle from the skin. They most often appear on the face and neck.    Flat warts. These are small, smooth growths. They tend to form in clusters on the face, backs of the hands, or legs.    Genital warts. These can appear on or around the genitals. These warts can spread and are linked to cervical, anal, and other cancers. So it is important to have them treated quickly and to discuss these with sexual partners.  Date Last Reviewed: 2/1/2017 2000-2018 [employer]. [address] [place] [address], [place] [address]. All rights reserved. This information is not intended as a substitute for professional medical care. Always follow your healthcare professional's instructions.

## 2019-03-26 DIAGNOSIS — F41.1 GAD (GENERALIZED ANXIETY DISORDER): ICD-10-CM

## 2019-03-26 RX ORDER — SERTRALINE HYDROCHLORIDE 100 MG/1
TABLET, FILM COATED ORAL
Qty: 30 TABLET | Refills: 0 | Status: SHIPPED | OUTPATIENT
Start: 2019-03-26 | End: 2019-05-02 | Stop reason: DRUGHIGH

## 2019-05-02 ENCOUNTER — OFFICE VISIT (OUTPATIENT)
Dept: FAMILY MEDICINE | Facility: CLINIC | Age: 29
End: 2019-05-02
Payer: COMMERCIAL

## 2019-05-02 VITALS
DIASTOLIC BLOOD PRESSURE: 84 MMHG | TEMPERATURE: 98.3 F | BODY MASS INDEX: 33.06 KG/M2 | WEIGHT: 230.4 LBS | RESPIRATION RATE: 12 BRPM | HEART RATE: 71 BPM | OXYGEN SATURATION: 96 % | SYSTOLIC BLOOD PRESSURE: 136 MMHG

## 2019-05-02 DIAGNOSIS — F41.1 GAD (GENERALIZED ANXIETY DISORDER): ICD-10-CM

## 2019-05-02 PROCEDURE — 99213 OFFICE O/P EST LOW 20 MIN: CPT | Performed by: NURSE PRACTITIONER

## 2019-05-02 ASSESSMENT — ANXIETY QUESTIONNAIRES
GAD7 TOTAL SCORE: 4
5. BEING SO RESTLESS THAT IT IS HARD TO SIT STILL: NOT AT ALL
4. TROUBLE RELAXING: NOT AT ALL
3. WORRYING TOO MUCH ABOUT DIFFERENT THINGS: SEVERAL DAYS
1. FEELING NERVOUS, ANXIOUS, OR ON EDGE: SEVERAL DAYS
7. FEELING AFRAID AS IF SOMETHING AWFUL MIGHT HAPPEN: NOT AT ALL
2. NOT BEING ABLE TO STOP OR CONTROL WORRYING: NOT AT ALL
6. BECOMING EASILY ANNOYED OR IRRITABLE: MORE THAN HALF THE DAYS

## 2019-05-02 ASSESSMENT — PATIENT HEALTH QUESTIONNAIRE - PHQ9: SUM OF ALL RESPONSES TO PHQ QUESTIONS 1-9: 1

## 2019-05-02 NOTE — PROGRESS NOTES
SUBJECTIVE:   Elkin Mullen is a 28 year old male who presents to clinic today for the following   health issues:  Anxiety and depression follow up, states symptoms well controlled, not even sure why he is still taking Zoloft, would like to taper down to lower dose.   Anxiety Follow-Up    Status since last visit: No change. States that he would prefer not to take it, but does not want to taper down dose due to frequent appointments that will be needed.    Other associated symptoms:None    Complicating factors:   Significant life event: No   Current substance abuse: None  Depression symptoms: No  LAURA-7 SCORE 5/10/2017 3/6/2018 5/2/2019   Total Score - - -   Total Score 5 19 4       Additional history: as documented    Reviewed  and updated as needed this visit by clinical staff  Tobacco  Allergies  Meds  Med Hx  Surg Hx  Fam Hx  Soc Hx        Reviewed and updated as needed this visit by Provider         BP Readings from Last 3 Encounters:   05/02/19 136/84   12/07/18 118/64   03/06/18 130/88    Wt Readings from Last 3 Encounters:   05/02/19 104.5 kg (230 lb 6.4 oz)   12/07/18 104.3 kg (230 lb)   03/06/18 97.1 kg (214 lb)                  Labs reviewed in EPIC    ROS:  Constitutional, HEENT, cardiovascular, pulmonary, gi and gu systems are negative, except as otherwise noted.    OBJECTIVE:     /84   Pulse 71   Temp 98.3  F (36.8  C) (Tympanic)   Resp 12   Wt 104.5 kg (230 lb 6.4 oz)   SpO2 96%   BMI 33.06 kg/m    Body mass index is 33.06 kg/m .  GENERAL: healthy, alert and no distress  NEURO: Normal strength and tone, mentation intact and speech normal  PSYCH: mentation appears normal, affect normal/bright    Diagnostic Test Results:  none     ASSESSMENT/PLAN:     1. LAURA (generalized anxiety disorder)  - sertraline (ZOLOFT) 50 MG tablet; Take 1.5 tablets (75 mg) by mouth daily  Dispense: 45 tablet; Refill: 11  -follow up in 6 months if symptoms not well controlled, or sooner     See Patient  Instructions    THADDEUS Yu [facility]

## 2019-05-02 NOTE — PATIENT INSTRUCTIONS
Reduce to 75 mg daily (1.5 tablets) follow up in 6 months if this plan is not working well enough for you.

## 2019-05-02 NOTE — NURSING NOTE
"Initial /84   Pulse 71   Temp 98.3  F (36.8  C) (Tympanic)   Resp 12   Wt 104.5 kg (230 lb 6.4 oz)   SpO2 96%   BMI 33.06 kg/m   Estimated body mass index is 33.06 kg/m  as calculated from the following:    Height as of 12/7/18: 1.778 m (5' 10\").    Weight as of this encounter: 104.5 kg (230 lb 6.4 oz). .      "

## 2019-05-03 ASSESSMENT — ANXIETY QUESTIONNAIRES: GAD7 TOTAL SCORE: 4

## 2020-01-13 ENCOUNTER — OFFICE VISIT (OUTPATIENT)
Dept: FAMILY MEDICINE | Facility: CLINIC | Age: 30
End: 2020-01-13
Payer: COMMERCIAL

## 2020-01-13 VITALS
SYSTOLIC BLOOD PRESSURE: 124 MMHG | RESPIRATION RATE: 10 BRPM | WEIGHT: 222.8 LBS | OXYGEN SATURATION: 96 % | HEIGHT: 71 IN | TEMPERATURE: 99 F | DIASTOLIC BLOOD PRESSURE: 82 MMHG | BODY MASS INDEX: 31.19 KG/M2 | HEART RATE: 79 BPM

## 2020-01-13 DIAGNOSIS — J20.9 ACUTE BRONCHITIS WITH SYMPTOMS > 10 DAYS: Primary | ICD-10-CM

## 2020-01-13 PROCEDURE — 99213 OFFICE O/P EST LOW 20 MIN: CPT | Performed by: NURSE PRACTITIONER

## 2020-01-13 RX ORDER — AZITHROMYCIN 250 MG/1
TABLET, FILM COATED ORAL
Qty: 6 TABLET | Refills: 0 | Status: SHIPPED | OUTPATIENT
Start: 2020-01-13 | End: 2020-01-18

## 2020-01-13 RX ORDER — ALBUTEROL SULFATE 90 UG/1
2 AEROSOL, METERED RESPIRATORY (INHALATION) EVERY 4 HOURS PRN
Qty: 1 INHALER | Refills: 1 | Status: SHIPPED | OUTPATIENT
Start: 2020-01-13

## 2020-01-13 RX ORDER — PREDNISONE 20 MG/1
40 TABLET ORAL DAILY
Qty: 10 TABLET | Refills: 0 | Status: SHIPPED | OUTPATIENT
Start: 2020-01-13 | End: 2020-01-18

## 2020-01-13 ASSESSMENT — MIFFLIN-ST. JEOR: SCORE: 1992.74

## 2020-01-13 NOTE — PATIENT INSTRUCTIONS
There were no signs of pneumonia.    Your symptoms are most likely due to acute bronchitis.  This is inflammation of the tubes leading into the lungs, most often due to a viral infection and an antibiotic will not help this.    I have prescribed an albuterol inhaler to help with the cough/wheezing.  I have also prescribed prednisone to help with the wheezing, inflammation.    May also try to use Mucinex or Robitussin.    Start azithromycin today.     Please monitor symptoms carefully.  If developing chest pain, shortness of breath, fever, coughing up blood, extreme fatigue, or any other new, concerning symptoms, come back to clinic or go to ER immediately.  Otherwise, if no improvement in symptoms in one week, follow-up with your primary care provider.    Prednisone Discharge Instructions:  Please take the steroid, Prednisone, for the full course as prescribed.  Take Prednisone with food or milk to minimize stomach upset.      Side effects of Prednisone include difficulty sleeping, increased appetite, weight gain, and changes in mood.  If you are diabetic, please monitor your blood sugar regularly while taking this medicine as Prednisone can cause high blood sugar.

## 2020-01-13 NOTE — PROGRESS NOTES
Subjective     Elkin Mullen is a 29 year old male who presents to clinic today for the following health issues:    HPI   ENT Symptoms             Symptoms: cc Present Absent Comment   Fever/Chills  x     Fatigue   x    Muscle Aches   x    Eye Irritation   x    Sneezing   x    Nasal Chay/Drg   x    Sinus Pressure/Pain   x    Loss of smell   x    Dental pain   x    Sore Throat   x    Swollen Glands   x    Ear Pain/Fullness   x    Cough x x  Coughing up some phlegm,    Wheeze   x    Chest Pain   x    Shortness of breath  x     Rash   x    Other  x  Deep breathing makes him cough      Symptom duration:  3 weeks to 1 month ago    Symptom severity:  mild    Treatments tried: No    Contacts:  ?     Above HPI reviewed. Additionally, cough is intermittently productive, activity makes cough worse.  No hemoptysis or dyspnea on exertion. Otherwise healthy.      Patient Active Problem List   Diagnosis     Anxiety     CARDIOVASCULAR SCREENING; LDL GOAL LESS THAN 160     BMI 30.0-30.9,adult     LAURA (generalized anxiety disorder)     History reviewed. No pertinent surgical history.    Social History     Tobacco Use     Smoking status: Former Smoker     Smokeless tobacco: Former User   Substance Use Topics     Alcohol use: Yes     Comment: weekends     Family History   Problem Relation Age of Onset     Diabetes Maternal Grandmother          Current Outpatient Medications   Medication Sig Dispense Refill     albuterol (PROAIR HFA/PROVENTIL HFA/VENTOLIN HFA) 108 (90 Base) MCG/ACT inhaler Inhale 2 puffs into the lungs every 4 hours as needed for shortness of breath / dyspnea or wheezing 1 Inhaler 1     azithromycin (ZITHROMAX) 250 MG tablet Take 2 tablets (500 mg) by mouth daily for 1 day, THEN 1 tablet (250 mg) daily for 4 days. 6 tablet 0     predniSONE (DELTASONE) 20 MG tablet Take 2 tablets (40 mg) by mouth daily for 5 days 10 tablet 0     sertraline (ZOLOFT) 50 MG tablet Take 1.5 tablets (75 mg) by mouth daily 45 tablet 11  "      Reviewed and updated as needed this visit by Provider  Tobacco  Allergies  Meds  Problems  Med Hx  Surg Hx  Fam Hx         Review of Systems   ROS COMP: Constitutional, HEENT, cardiovascular, pulmonary, gi and gu systems are negative, except as otherwise noted.      Objective    /82 (BP Location: Right arm, Patient Position: Sitting, Cuff Size: Adult Regular)   Pulse 79   Temp 99  F (37.2  C) (Tympanic)   Resp 10   Ht 1.803 m (5' 11\")   Wt 101.1 kg (222 lb 12.8 oz)   SpO2 96%   BMI 31.07 kg/m    Body mass index is 31.07 kg/m .  Physical Exam  Vitals signs and nursing note reviewed.   Constitutional:       Appearance: Normal appearance.   HENT:      Head: Normocephalic and atraumatic.      Right Ear: Tympanic membrane and ear canal normal.      Left Ear: Tympanic membrane and ear canal normal.      Nose: Nose normal. No rhinorrhea.      Right Sinus: No maxillary sinus tenderness or frontal sinus tenderness.      Left Sinus: No maxillary sinus tenderness or frontal sinus tenderness.      Mouth/Throat:      Lips: Pink.      Mouth: Mucous membranes are moist.      Pharynx: Oropharynx is clear.   Eyes:      General: Lids are normal.      Conjunctiva/sclera: Conjunctivae normal.      Comments: Non icteric   Neck:      Musculoskeletal: Neck supple.   Cardiovascular:      Rate and Rhythm: Normal rate and regular rhythm.      Pulses: Normal pulses.      Heart sounds: Normal heart sounds, S1 normal and S2 normal.   Pulmonary:      Effort: Pulmonary effort is normal.      Breath sounds: Normal air entry. Wheezing (expiratory, scattered) present.   Lymphadenopathy:      Cervical: No cervical adenopathy.   Skin:     General: Skin is warm and dry.   Neurological:      General: No focal deficit present.      Mental Status: He is alert and oriented to person, place, and time.   Psychiatric:         Mood and Affect: Mood normal.         Behavior: Behavior normal.         Thought Content: Thought content " "normal.         Judgment: Judgment normal.          Diagnostic Test Results:  Labs reviewed in Epic  none         Assessment & Plan     1. Acute bronchitis with symptoms > 10 days  Wheezing present on exam, not hypoxic. Non smoker. Given length of symptoms, will start azithromycin as well as prednisone and albuterol.  RTC if no improvement in one week.  - predniSONE (DELTASONE) 20 MG tablet; Take 2 tablets (40 mg) by mouth daily for 5 days  Dispense: 10 tablet; Refill: 0  - azithromycin (ZITHROMAX) 250 MG tablet; Take 2 tablets (500 mg) by mouth daily for 1 day, THEN 1 tablet (250 mg) daily for 4 days.  Dispense: 6 tablet; Refill: 0  - albuterol (PROAIR HFA/PROVENTIL HFA/VENTOLIN HFA) 108 (90 Base) MCG/ACT inhaler; Inhale 2 puffs into the lungs every 4 hours as needed for shortness of breath / dyspnea or wheezing  Dispense: 1 Inhaler; Refill: 1     BMI:   Estimated body mass index is 31.07 kg/m  as calculated from the following:    Height as of this encounter: 1.803 m (5' 11\").    Weight as of this encounter: 101.1 kg (222 lb 12.8 oz).         Patient Instructions   There were no signs of pneumonia.    Your symptoms are most likely due to acute bronchitis.  This is inflammation of the tubes leading into the lungs, most often due to a viral infection and an antibiotic will not help this.    I have prescribed an albuterol inhaler to help with the cough/wheezing.  I have also prescribed prednisone to help with the wheezing, inflammation.    May also try to use Mucinex or Robitussin.    Start azithromycin today.     Please monitor symptoms carefully.  If developing chest pain, shortness of breath, fever, coughing up blood, extreme fatigue, or any other new, concerning symptoms, come back to clinic or go to ER immediately.  Otherwise, if no improvement in symptoms in one week, follow-up with your primary care provider.    Prednisone Discharge Instructions:  Please take the steroid, Prednisone, for the full course as " prescribed.  Take Prednisone with food or milk to minimize stomach upset.      Side effects of Prednisone include difficulty sleeping, increased appetite, weight gain, and changes in mood.  If you are diabetic, please monitor your blood sugar regularly while taking this medicine as Prednisone can cause high blood sugar.        Return in about 2 weeks (around 1/27/2020).    THADDEUS Gannon [facility]

## 2020-01-13 NOTE — NURSING NOTE
"Initial /82 (BP Location: Right arm, Patient Position: Sitting, Cuff Size: Adult Regular)   Pulse 79   Temp 99  F (37.2  C) (Tympanic)   Resp 10   Ht 1.803 m (5' 11\")   Wt 101.1 kg (222 lb 12.8 oz)   SpO2 96%   BMI 31.07 kg/m   Estimated body mass index is 31.07 kg/m  as calculated from the following:    Height as of this encounter: 1.803 m (5' 11\").    Weight as of this encounter: 101.1 kg (222 lb 12.8 oz). .      "

## 2020-07-19 DIAGNOSIS — F41.1 GAD (GENERALIZED ANXIETY DISORDER): ICD-10-CM

## 2020-07-22 NOTE — TELEPHONE ENCOUNTER
"Routing refill request to provider for review/approval because:  Patient needs to be seen because it has been more than 1 year since last office visit - with prescribing provider.      Requested Prescriptions   Pending Prescriptions Disp Refills     sertraline (ZOLOFT) 50 MG tablet [Pharmacy Med Name: SERTRALINE HCL 50MG TABS] 45 tablet 11     Sig: TAKE 1&1/2 TABLETS BY MOUTH DAILY       SSRIs Protocol Passed - 7/19/2020 12:17 PM        Passed - Recent (12 mo) or future (30 days) visit within the authorizing provider's specialty     Patient has had an office visit with the authorizing provider or a provider within the authorizing providers department within the previous 12 mos or has a future within next 30 days. See \"Patient Info\" tab in inbasket, or \"Choose Columns\" in Meds & Orders section of the refill encounter.              Passed - Medication is active on med list        Passed - Patient is age 18 or older           Radha GAMBINO RN BSN    "

## 2020-08-21 DIAGNOSIS — F41.1 GAD (GENERALIZED ANXIETY DISORDER): ICD-10-CM

## 2020-08-21 NOTE — TELEPHONE ENCOUNTER
"Requested Prescriptions   Pending Prescriptions Disp Refills     sertraline (ZOLOFT) 50 MG tablet [Pharmacy Med Name: SERTRALINE HCL 50 MG TABLET] 45 tablet 0     Sig: TAKE 1 & 1/2 TABLETS BY MOUTH DAILY       SSRIs Protocol Passed - 8/21/2020  3:25 AM        Passed - Recent (12 mo) or future (30 days) visit within the authorizing provider's specialty     Patient has had an office visit with the authorizing provider or a provider within the authorizing providers department within the previous 12 mos or has a future within next 30 days. See \"Patient Info\" tab in inbasket, or \"Choose Columns\" in Meds & Orders section of the refill encounter.              Passed - Medication is active on med list        Passed - Patient is age 18 or older             "